# Patient Record
Sex: MALE | Race: WHITE | Employment: OTHER | URBAN - METROPOLITAN AREA
[De-identification: names, ages, dates, MRNs, and addresses within clinical notes are randomized per-mention and may not be internally consistent; named-entity substitution may affect disease eponyms.]

---

## 2017-01-12 ENCOUNTER — OFFICE VISIT (OUTPATIENT)
Dept: CARDIOLOGY CLINIC | Age: 82
End: 2017-01-12

## 2017-01-12 VITALS
OXYGEN SATURATION: 97 % | DIASTOLIC BLOOD PRESSURE: 60 MMHG | BODY MASS INDEX: 24.8 KG/M2 | WEIGHT: 140 LBS | HEART RATE: 50 BPM | SYSTOLIC BLOOD PRESSURE: 158 MMHG | HEIGHT: 63 IN

## 2017-01-12 DIAGNOSIS — I34.0 MITRAL VALVE INSUFFICIENCY, UNSPECIFIED ETIOLOGY: Primary | ICD-10-CM

## 2017-01-12 DIAGNOSIS — I50.22 CHRONIC SYSTOLIC CHF (CONGESTIVE HEART FAILURE) (HCC): ICD-10-CM

## 2017-01-12 DIAGNOSIS — I42.9 CARDIOMYOPATHY (HCC): ICD-10-CM

## 2017-01-12 DIAGNOSIS — I10 ESSENTIAL HYPERTENSION: ICD-10-CM

## 2017-01-12 PROCEDURE — 99214 OFFICE O/P EST MOD 30 MIN: CPT | Performed by: INTERNAL MEDICINE

## 2017-01-14 ENCOUNTER — HOSPITAL ENCOUNTER (OUTPATIENT)
Dept: OTHER | Age: 82
Discharge: HOME | End: 2017-01-14
Attending: INTERNAL MEDICINE | Admitting: INTERNAL MEDICINE

## 2017-01-14 LAB
A/G RATIO: 1.1 (ref 1.1–2.2)
ALBUMIN SERPL-MCNC: 3.9 G/DL (ref 3.4–5)
ALP BLD-CCNC: 77 U/L (ref 40–129)
ALT SERPL-CCNC: 13 U/L (ref 10–40)
ANION GAP SERPL CALCULATED.3IONS-SCNC: 10 MMOL/L (ref 3–16)
AST SERPL-CCNC: 18 U/L (ref 15–37)
BILIRUB SERPL-MCNC: 0.9 MG/DL (ref 0–1)
BUN BLDV-MCNC: 20 MG/DL (ref 7–20)
CALCIUM SERPL-MCNC: 9 MG/DL (ref 8.3–10.6)
CHLORIDE BLD-SCNC: 101 MMOL/L (ref 99–110)
CHOLESTEROL, TOTAL: 200 MG/DL (ref 0–199)
CO2: 27 MMOL/L (ref 21–32)
CREAT SERPL-MCNC: 1.2 MG/DL (ref 0.8–1.3)
GFR AFRICAN AMERICAN: >60
GFR NON-AFRICAN AMERICAN: 58
GLOBULIN: 3.6 G/DL
GLUCOSE BLD-MCNC: 91 MG/DL (ref 70–99)
HCT VFR BLD CALC: 39.4 % (ref 40.5–52.5)
HDLC SERPL-MCNC: 33 MG/DL (ref 40–60)
HEMOGLOBIN: 13.1 G/DL (ref 13.5–17.5)
LDL CHOLESTEROL CALCULATED: 139 MG/DL
MCH RBC QN AUTO: 29.9 PG (ref 26–34)
MCHC RBC AUTO-ENTMCNC: 33.3 G/DL (ref 31–36)
MCV RBC AUTO: 89.8 FL (ref 80–100)
PDW BLD-RTO: 14.1 % (ref 12.4–15.4)
PLATELET # BLD: 199 K/UL (ref 135–450)
PMV BLD AUTO: 8.2 FL (ref 5–10.5)
POTASSIUM SERPL-SCNC: 4.3 MMOL/L (ref 3.5–5.1)
RBC # BLD: 4.38 M/UL (ref 4.2–5.9)
SODIUM BLD-SCNC: 138 MMOL/L (ref 136–145)
TOTAL PROTEIN: 7.5 G/DL (ref 6.4–8.2)
TRIGL SERPL-MCNC: 139 MG/DL (ref 0–150)
VLDLC SERPL CALC-MCNC: 28 MG/DL
WBC # BLD: 6.8 K/UL (ref 4–11)

## 2017-01-16 ENCOUNTER — OFFICE VISIT (OUTPATIENT)
Dept: INTERNAL MEDICINE CLINIC | Age: 82
End: 2017-01-16

## 2017-01-16 ENCOUNTER — TELEPHONE (OUTPATIENT)
Dept: CARDIOLOGY CLINIC | Age: 82
End: 2017-01-16

## 2017-01-16 VITALS
DIASTOLIC BLOOD PRESSURE: 80 MMHG | RESPIRATION RATE: 14 BRPM | SYSTOLIC BLOOD PRESSURE: 140 MMHG | HEART RATE: 55 BPM | WEIGHT: 140 LBS | HEIGHT: 62 IN | BODY MASS INDEX: 25.76 KG/M2

## 2017-01-16 DIAGNOSIS — I42.9 CARDIOMYOPATHY (HCC): ICD-10-CM

## 2017-01-16 DIAGNOSIS — I10 ESSENTIAL HYPERTENSION: ICD-10-CM

## 2017-01-16 DIAGNOSIS — I50.22 CHRONIC SYSTOLIC HEART FAILURE (HCC): ICD-10-CM

## 2017-01-16 DIAGNOSIS — I50.22 CHRONIC SYSTOLIC CHF (CONGESTIVE HEART FAILURE) (HCC): Primary | ICD-10-CM

## 2017-01-16 DIAGNOSIS — I34.0 MITRAL VALVE INSUFFICIENCY, UNSPECIFIED ETIOLOGY: ICD-10-CM

## 2017-01-16 DIAGNOSIS — I42.9 SECONDARY CARDIOMYOPATHY, UNSPECIFIED: ICD-10-CM

## 2017-01-16 DIAGNOSIS — I11.0 HYPERTENSIVE HEART DISEASE WITH HEART FAILURE (HCC): ICD-10-CM

## 2017-01-16 PROCEDURE — 99213 OFFICE O/P EST LOW 20 MIN: CPT | Performed by: INTERNAL MEDICINE

## 2017-01-16 ASSESSMENT — ENCOUNTER SYMPTOMS
VOMITING: 0
BACK PAIN: 0
SHORTNESS OF BREATH: 0
ABDOMINAL PAIN: 0
WHEEZING: 0
NAUSEA: 0
RHINORRHEA: 0

## 2017-01-17 RX ORDER — AMLODIPINE BESYLATE 5 MG/1
5 TABLET ORAL DAILY
Qty: 90 TABLET | Refills: 3 | Status: SHIPPED | OUTPATIENT
Start: 2017-01-17 | End: 2018-01-04 | Stop reason: SDUPTHER

## 2017-01-17 RX ORDER — DIGOXIN 125 MCG
125 TABLET ORAL DAILY
Qty: 90 TABLET | Refills: 3 | Status: SHIPPED | OUTPATIENT
Start: 2017-01-17 | End: 2018-01-04 | Stop reason: SDUPTHER

## 2017-01-17 RX ORDER — LISINOPRIL 20 MG/1
20 TABLET ORAL DAILY
Qty: 90 TABLET | Refills: 3 | Status: SHIPPED | OUTPATIENT
Start: 2017-01-17 | End: 2018-01-04 | Stop reason: SDUPTHER

## 2017-01-17 RX ORDER — CARVEDILOL 12.5 MG/1
12.5 TABLET ORAL 2 TIMES DAILY
Qty: 180 TABLET | Refills: 3 | Status: SHIPPED | OUTPATIENT
Start: 2017-01-17 | End: 2018-01-04 | Stop reason: SDUPTHER

## 2017-04-25 ENCOUNTER — OFFICE VISIT (OUTPATIENT)
Dept: INTERNAL MEDICINE CLINIC | Age: 82
End: 2017-04-25

## 2017-04-25 VITALS
DIASTOLIC BLOOD PRESSURE: 80 MMHG | HEIGHT: 62 IN | HEART RATE: 70 BPM | RESPIRATION RATE: 14 BRPM | WEIGHT: 140 LBS | SYSTOLIC BLOOD PRESSURE: 150 MMHG | BODY MASS INDEX: 25.76 KG/M2

## 2017-04-25 DIAGNOSIS — I50.22 CHRONIC SYSTOLIC CHF (CONGESTIVE HEART FAILURE) (HCC): ICD-10-CM

## 2017-04-25 DIAGNOSIS — I11.0 HYPERTENSIVE HEART DISEASE WITH HEART FAILURE (HCC): ICD-10-CM

## 2017-04-25 DIAGNOSIS — I10 ESSENTIAL HYPERTENSION: Primary | ICD-10-CM

## 2017-04-25 DIAGNOSIS — I34.0 MITRAL VALVE INSUFFICIENCY, UNSPECIFIED ETIOLOGY: ICD-10-CM

## 2017-04-25 DIAGNOSIS — I42.9 CARDIOMYOPATHY (HCC): ICD-10-CM

## 2017-04-25 PROCEDURE — 99213 OFFICE O/P EST LOW 20 MIN: CPT | Performed by: INTERNAL MEDICINE

## 2017-04-25 ASSESSMENT — ENCOUNTER SYMPTOMS
RHINORRHEA: 0
VOMITING: 0
ABDOMINAL PAIN: 0
WHEEZING: 0
SHORTNESS OF BREATH: 0
NAUSEA: 0
BACK PAIN: 0

## 2017-07-31 ENCOUNTER — OFFICE VISIT (OUTPATIENT)
Dept: INTERNAL MEDICINE CLINIC | Age: 82
End: 2017-07-31

## 2017-07-31 VITALS
HEART RATE: 70 BPM | DIASTOLIC BLOOD PRESSURE: 80 MMHG | HEIGHT: 62 IN | BODY MASS INDEX: 24.66 KG/M2 | WEIGHT: 134 LBS | SYSTOLIC BLOOD PRESSURE: 140 MMHG | RESPIRATION RATE: 14 BRPM

## 2017-07-31 DIAGNOSIS — I50.22 CHRONIC SYSTOLIC CHF (CONGESTIVE HEART FAILURE) (HCC): ICD-10-CM

## 2017-07-31 DIAGNOSIS — I42.9 CARDIOMYOPATHY, UNSPECIFIED TYPE (HCC): ICD-10-CM

## 2017-07-31 DIAGNOSIS — I10 ESSENTIAL HYPERTENSION: ICD-10-CM

## 2017-07-31 DIAGNOSIS — I11.0 HYPERTENSIVE HEART DISEASE WITH HEART FAILURE (HCC): Primary | ICD-10-CM

## 2017-07-31 DIAGNOSIS — I34.0 MITRAL VALVE INSUFFICIENCY, UNSPECIFIED ETIOLOGY: ICD-10-CM

## 2017-07-31 LAB
BASOPHILS ABSOLUTE: 0 K/UL (ref 0–0.2)
BASOPHILS RELATIVE PERCENT: 0.6 %
EOSINOPHILS ABSOLUTE: 0.1 K/UL (ref 0–0.6)
EOSINOPHILS RELATIVE PERCENT: 1.8 %
HCT VFR BLD CALC: 41.7 % (ref 40.5–52.5)
HEMOGLOBIN: 14 G/DL (ref 13.5–17.5)
LYMPHOCYTES ABSOLUTE: 1.6 K/UL (ref 1–5.1)
LYMPHOCYTES RELATIVE PERCENT: 24.6 %
MCH RBC QN AUTO: 31 PG (ref 26–34)
MCHC RBC AUTO-ENTMCNC: 33.6 G/DL (ref 31–36)
MCV RBC AUTO: 92.3 FL (ref 80–100)
MONOCYTES ABSOLUTE: 0.6 K/UL (ref 0–1.3)
MONOCYTES RELATIVE PERCENT: 9.1 %
NEUTROPHILS ABSOLUTE: 4.2 K/UL (ref 1.7–7.7)
NEUTROPHILS RELATIVE PERCENT: 63.9 %
PDW BLD-RTO: 14.1 % (ref 12.4–15.4)
PLATELET # BLD: 229 K/UL (ref 135–450)
PMV BLD AUTO: 8.1 FL (ref 5–10.5)
RBC # BLD: 4.52 M/UL (ref 4.2–5.9)
WBC # BLD: 6.6 K/UL (ref 4–11)

## 2017-07-31 PROCEDURE — 99213 OFFICE O/P EST LOW 20 MIN: CPT | Performed by: INTERNAL MEDICINE

## 2017-07-31 ASSESSMENT — ENCOUNTER SYMPTOMS
ABDOMINAL PAIN: 0
NAUSEA: 0
RHINORRHEA: 0
VOMITING: 0
BACK PAIN: 0
SHORTNESS OF BREATH: 0
WHEEZING: 0

## 2017-08-01 LAB
A/G RATIO: 1.3 (ref 1.1–2.2)
ALBUMIN SERPL-MCNC: 4.3 G/DL (ref 3.4–5)
ALP BLD-CCNC: 72 U/L (ref 40–129)
ALT SERPL-CCNC: 14 U/L (ref 10–40)
ANION GAP SERPL CALCULATED.3IONS-SCNC: 14 MMOL/L (ref 3–16)
AST SERPL-CCNC: 17 U/L (ref 15–37)
BILIRUB SERPL-MCNC: 0.8 MG/DL (ref 0–1)
BUN BLDV-MCNC: 25 MG/DL (ref 7–20)
CALCIUM SERPL-MCNC: 9.5 MG/DL (ref 8.3–10.6)
CHLORIDE BLD-SCNC: 98 MMOL/L (ref 99–110)
CO2: 27 MMOL/L (ref 21–32)
CREAT SERPL-MCNC: 1.1 MG/DL (ref 0.8–1.3)
GFR AFRICAN AMERICAN: >60
GFR NON-AFRICAN AMERICAN: >60
GLOBULIN: 3.2 G/DL
GLUCOSE BLD-MCNC: 102 MG/DL (ref 70–99)
POTASSIUM SERPL-SCNC: 4.7 MMOL/L (ref 3.5–5.1)
SODIUM BLD-SCNC: 139 MMOL/L (ref 136–145)
TOTAL PROTEIN: 7.5 G/DL (ref 6.4–8.2)

## 2017-11-06 ENCOUNTER — OFFICE VISIT (OUTPATIENT)
Dept: INTERNAL MEDICINE CLINIC | Age: 82
End: 2017-11-06

## 2017-11-06 VITALS
HEIGHT: 63 IN | WEIGHT: 136 LBS | DIASTOLIC BLOOD PRESSURE: 80 MMHG | SYSTOLIC BLOOD PRESSURE: 150 MMHG | HEART RATE: 70 BPM | RESPIRATION RATE: 14 BRPM | BODY MASS INDEX: 24.1 KG/M2

## 2017-11-06 DIAGNOSIS — I11.0 HYPERTENSIVE HEART DISEASE WITH HEART FAILURE (HCC): ICD-10-CM

## 2017-11-06 DIAGNOSIS — I50.22 CHRONIC SYSTOLIC CHF (CONGESTIVE HEART FAILURE) (HCC): ICD-10-CM

## 2017-11-06 DIAGNOSIS — I10 ESSENTIAL HYPERTENSION: Primary | ICD-10-CM

## 2017-11-06 DIAGNOSIS — I42.9 CARDIOMYOPATHY, UNSPECIFIED TYPE (HCC): ICD-10-CM

## 2017-11-06 DIAGNOSIS — I34.0 MITRAL VALVE INSUFFICIENCY, UNSPECIFIED ETIOLOGY: ICD-10-CM

## 2017-11-06 PROCEDURE — 99214 OFFICE O/P EST MOD 30 MIN: CPT | Performed by: INTERNAL MEDICINE

## 2017-11-06 ASSESSMENT — ENCOUNTER SYMPTOMS
NAUSEA: 0
RHINORRHEA: 0
WHEEZING: 0
ABDOMINAL PAIN: 0
SHORTNESS OF BREATH: 0
BACK PAIN: 0
VOMITING: 0

## 2017-11-06 NOTE — PROGRESS NOTES
velocities across the mitral valve  suggesting type I diastolic dysfunction. Mild aortic regurgitation is present. Assessment:      1. Essential hypertension     2. Hypertensive heart disease with heart failure ([address]     3. Chronic systolic CHF (congestive heart failure) (HCC)     4. Cardiomyopathy, unspecified type ([facility] [address]     5. Mitral valve insufficiency, unspecified etiology            Plan:      Chronic systolic CHF. He is off lasix and aldactone. He is on Coreg and Lisinopril. Doing well. 2.  Hypertension:  Fair control. 3.  Hypertensive heart disease. From uncontrolled HTN. Improved. 4.  CMP from hypertension. Stable. 5.  MR and AI: stable. He refused flu shot and labs.

## 2018-01-04 ENCOUNTER — OFFICE VISIT (OUTPATIENT)
Dept: CARDIOLOGY CLINIC | Age: 83
End: 2018-01-04

## 2018-01-04 VITALS
BODY MASS INDEX: 24.45 KG/M2 | HEART RATE: 54 BPM | OXYGEN SATURATION: 97 % | WEIGHT: 138 LBS | HEIGHT: 63 IN | DIASTOLIC BLOOD PRESSURE: 84 MMHG | SYSTOLIC BLOOD PRESSURE: 140 MMHG

## 2018-01-04 DIAGNOSIS — I42.9 CARDIOMYOPATHY, UNSPECIFIED TYPE (HCC): ICD-10-CM

## 2018-01-04 DIAGNOSIS — I50.22 CHRONIC SYSTOLIC CHF (CONGESTIVE HEART FAILURE) (HCC): Primary | ICD-10-CM

## 2018-01-04 DIAGNOSIS — I11.0 HYPERTENSIVE HEART DISEASE WITH HEART FAILURE (HCC): ICD-10-CM

## 2018-01-04 PROCEDURE — 99214 OFFICE O/P EST MOD 30 MIN: CPT | Performed by: INTERNAL MEDICINE

## 2018-01-04 RX ORDER — DIGOXIN 125 MCG
125 TABLET ORAL DAILY
Qty: 90 TABLET | Refills: 3 | Status: SHIPPED | OUTPATIENT
Start: 2018-01-04 | End: 2019-01-22 | Stop reason: SDUPTHER

## 2018-01-04 RX ORDER — LISINOPRIL 20 MG/1
20 TABLET ORAL DAILY
Qty: 90 TABLET | Refills: 3 | Status: SHIPPED | OUTPATIENT
Start: 2018-01-04 | End: 2019-01-22 | Stop reason: SDUPTHER

## 2018-01-04 RX ORDER — CARVEDILOL 12.5 MG/1
12.5 TABLET ORAL 2 TIMES DAILY
Qty: 180 TABLET | Refills: 3 | Status: SHIPPED | OUTPATIENT
Start: 2018-01-04 | End: 2019-01-22 | Stop reason: SDUPTHER

## 2018-01-04 RX ORDER — AMLODIPINE BESYLATE 5 MG/1
5 TABLET ORAL DAILY
Qty: 90 TABLET | Refills: 3 | Status: SHIPPED | OUTPATIENT
Start: 2018-01-04 | End: 2019-01-22 | Stop reason: SDUPTHER

## 2018-01-04 NOTE — PROGRESS NOTES
[address] Office Note  2018     Subjective:  Mr. Jade Faust is here for cardiac follow up sCHF, HTN,CMP, MR    Today he presents for follow up. States he has been feeling in good shape. He denies any active chest pain, SOB, palpitations, dizziness/syncope or edema. He is doing good from cardiac standpoint. He reports his home BP readings has been good average 862-963 systolic over 34-46. He monitors his BP at home. HPI:  Schuyler Cabot is a 80 y.o. patient with PMH HTN which has been untreated for 20 years. He reports no medical care for many years. He was admitted to [facility] 1/7/15    His son took his Blood pressure which was noted to be high systolic 164. Patients SOB progressively worsened over week until he had to sleep in recliner for rest.  In the ER his BP was 194/120, EKG revealed Sinus tachycardiaPossible Left atrial enlargementST & T wave abnormality, consider lateral ischemia, Troponin was negative, BNP was 11705. Echo this admission shows EF 26%, Severe global hypokinesis, Moderate mitral regurgitation, moderate tricuspid regurgitation, moderate pulmonary hypertension. Repeat Limited Echo on 2015 with EF of 62% and improved MR from moderate to mild. Review of Systems:  12 point ROS negative in all areas as listed below except as in [place]  Constitutional, EENT, Cardiovascular, pulmonary, GI, , Musculoskeletal, skin, neurological, hematological, endocrine, Psychiatric    Reviewed past medical history, social, and family history.             Both parents  suddenly  Nonsmoker no alcohol former smoker 2 ppd x 30 yrs quit 36 yrs ago  Dad no heart dz Mom no heart dz  Past Medical History:   Diagnosis Date    Chronic systolic CHF [facility]congestive heart failure) ([facility] [address]     Heart disease     Hypertension      Past Surgical History:   Procedure Laterality Date    CATARACT REMOVAL WITH IMPLANT Left 3/16/16    EYE SURGERY Right 16    Cataract removal with implant Dr. Simone Garcia for: CHLPL  Lab Results   Component Value Date    TRIG 139 01/14/2017    TRIG 66 01/08/2015     Lab Results   Component Value Date    HDL 33 (L) 01/14/2017    HDL 34 (L) 01/08/2015     Lab Results   Component Value Date    LDLCALC 139 (H) 01/14/2017    LDLCALC 98 01/08/2015     Lab Results   Component Value Date    LABVLDL 28 01/14/2017    LABVLDL 13 01/08/2015     PT/INR: No results for input(s): PROTIME, INR in the last 72 hours. A1C:   Lab Results   Component Value Date    LABA1C 5.2 01/08/2015     BNP:  No results for input(s): BNP in the last 72 hours. IMAGING:   EKG 1/14/16 SB with non specific ST and T changes in lateral leads   ECHO 7/21/15  Summary  This is a limited study for CHF, cardiomyopathy and mitral regurgitation. Ejection fraction calculated by Monge's method is 62 %. LV systolic function is normal with ejection fraction estimated to be 60%. No regional wall motion abnormalities are noted. Mild mitral regurgitation is present. There is mild concentric left ventricular hypertrophy. There is reversal of E/A inflow velocities across the mitral valve  suggesting type I diastolic dysfunction. Mild aortic regurgitation is present. ECHO 1/7/15  Summary  sinus rhythm is present during the test.  The ejection fraction measured by Monge's method is 26 %. Severe global hypokinesis is present. Left ventricular size is at upper limit of normal.  There is mild concentric left ventricular hypertrophy. Doppler study suggests diastolic dysfunction with impaired relaxation and  elevated filling pressure. Interventricular septum is flat consistent   with  volume and pressure overload of right ventricle. Mitral valve is structurally normal.  Mitral valve leaflets appear to open adequately. Moderate mitral regurgitation is present. ERO 0.41 with MR volume of 51ml. The left atrium is severely dilated by volume measurement.   The aortic valve is structurally normal.  The aortic leaflets appear to open

## 2018-01-04 NOTE — COMMUNICATION BODY
[address] Office Note  2018     Subjective:  Mr. Khang Adame is here for cardiac follow up sCHF, HTN,CMP, MR    Today he presents for follow up. States he has been feeling in good shape. He denies any active chest pain, SOB, palpitations, dizziness/syncope or edema. He is doing good from cardiac standpoint. He reports his home BP readings has been good average 149-612 systolic over 45-21. He monitors his BP at home. HPI:  Kd Smith is a 80 y.o. patient with PMH HTN which has been untreated for 20 years. He reports no medical care for many years. He was admitted to [facility] 1/7/15    His son took his Blood pressure which was noted to be high systolic 338. Patients SOB progressively worsened over week until he had to sleep in recliner for rest.  In the ER his BP was 194/120, EKG revealed Sinus tachycardiaPossible Left atrial enlargementST & T wave abnormality, consider lateral ischemia, Troponin was negative, BNP was 58161. Echo this admission shows EF 26%, Severe global hypokinesis, Moderate mitral regurgitation, moderate tricuspid regurgitation, moderate pulmonary hypertension. Repeat Limited Echo on 2015 with EF of 62% and improved MR from moderate to mild. Review of Systems:  12 point ROS negative in all areas as listed below except as in [place]  Constitutional, EENT, Cardiovascular, pulmonary, GI, , Musculoskeletal, skin, neurological, hematological, endocrine, Psychiatric    Reviewed past medical history, social, and family history.             Both parents  suddenly  Nonsmoker no alcohol former smoker 2 ppd x 30 yrs quit 36 yrs ago  Dad no heart dz Mom no heart dz  Past Medical History:   Diagnosis Date    Chronic systolic CHF (congestive heart failure) ([facility] [address]     Heart disease     Hypertension      Past Surgical History:   Procedure Laterality Date    CATARACT REMOVAL WITH IMPLANT Left 3/16/16    EYE SURGERY Right 16    Cataract removal with implant Dr. Lonnie Martinez for: CHLPL  Lab Results   Component Value Date    TRIG 139 01/14/2017    TRIG 66 01/08/2015     Lab Results   Component Value Date    HDL 33 (L) 01/14/2017    HDL 34 (L) 01/08/2015     Lab Results   Component Value Date    LDLCALC 139 (H) 01/14/2017    LDLCALC 98 01/08/2015     Lab Results   Component Value Date    LABVLDL 28 01/14/2017    LABVLDL 13 01/08/2015     PT/INR: No results for input(s): PROTIME, INR in the last 72 hours. A1C:   Lab Results   Component Value Date    LABA1C 5.2 01/08/2015     BNP:  No results for input(s): BNP in the last 72 hours. IMAGING:   EKG 1/14/16 SB with non specific ST and T changes in lateral leads   ECHO 7/21/15  Summary  This is a limited study for CHF, cardiomyopathy and mitral regurgitation. Ejection fraction calculated by Monge's method is 62 %. LV systolic function is normal with ejection fraction estimated to be 60%. No regional wall motion abnormalities are noted. Mild mitral regurgitation is present. There is mild concentric left ventricular hypertrophy. There is reversal of E/A inflow velocities across the mitral valve  suggesting type I diastolic dysfunction. Mild aortic regurgitation is present. ECHO 1/7/15  Summary  sinus rhythm is present during the test.  The ejection fraction measured by Monge's method is 26 %. Severe global hypokinesis is present. Left ventricular size is at upper limit of normal.  There is mild concentric left ventricular hypertrophy. Doppler study suggests diastolic dysfunction with impaired relaxation and  elevated filling pressure. Interventricular septum is flat consistent   with  volume and pressure overload of right ventricle. Mitral valve is structurally normal.  Mitral valve leaflets appear to open adequately. Moderate mitral regurgitation is present. ERO 0.41 with MR volume of 51ml. The left atrium is severely dilated by volume measurement.   The aortic valve is structurally normal.  The aortic leaflets appear to open adequately. The aortic valve appears tricuspid. Mild aortic regurgitation is present. The aortic root is slightly enlarged at 3.8cm. Right ventricular systolic function is normal .  The right ventricle is enlarged. Tricuspid valve is structurally normal.  The tricuspid valve leaflets appear to open adequately. There is moderate tricuspid regurgitation. Systolic pulmonary artery pressure (SPAP) is estimated at 51 mmHg   consistent  with moderate pulmonary hypertension (RA pressure 15 mmHg). The right atrium is severely dilated. IVC size is dilated (>2.1 cm) and collapses < 50% with respiration  consistent with markedly elevated RA pressure (15 mmHg). No obvious masses, thrombi, or vegetations are noted. Assessment:    Cardiomyopathy (HCC)EF normalized, no clinical signs of CHF. He is very active outside home. BP borderline high but he states at home it usually is below 140/90  Hypertensive heart disease with heart failure (HCC)    Mitral valve insufficiency, unspecified etiology    Chronic systolic CHF (congestive heart failure) ([facility] [address] resolved    Essential hypertension, home BP normal             Plan:  1. Doing well overall. 2. Follow up with me in 1 year   3. Will check   Fasting cmp and lipids patient wants to wait until he sees Dr Giovanni Taylor  4.all meds have been filled according to the list for one year. Existing Labs in epic reviewed   QUALITY MEASURES  1. Tobacco Cessation Counseling: NA  2. Retake of BP if >140/90:  Yes  3. Documentation to PCP/referring for new patient:  Sent to PCP at close of office visit  4. CAD patient on anti-platelet: Yes  5. CAD patient on STATIN therapy:  NA  6.  Patient with CHF and aFib on anticoagulation:  NA     [address] [place], MD 1/4/2018 12:45 PM

## 2018-02-27 ENCOUNTER — TELEPHONE (OUTPATIENT)
Dept: INTERNAL MEDICINE CLINIC | Age: 83
End: 2018-02-27

## 2018-06-26 ENCOUNTER — OFFICE VISIT (OUTPATIENT)
Dept: INTERNAL MEDICINE CLINIC | Age: 83
End: 2018-06-26

## 2018-06-26 VITALS
DIASTOLIC BLOOD PRESSURE: 80 MMHG | SYSTOLIC BLOOD PRESSURE: 150 MMHG | HEART RATE: 70 BPM | BODY MASS INDEX: 24.66 KG/M2 | HEIGHT: 62 IN | WEIGHT: 134 LBS | RESPIRATION RATE: 14 BRPM

## 2018-06-26 DIAGNOSIS — I50.22 CHRONIC SYSTOLIC CHF (CONGESTIVE HEART FAILURE) (HCC): ICD-10-CM

## 2018-06-26 DIAGNOSIS — I11.0 HYPERTENSIVE HEART DISEASE WITH HEART FAILURE (HCC): ICD-10-CM

## 2018-06-26 DIAGNOSIS — I10 ESSENTIAL HYPERTENSION: ICD-10-CM

## 2018-06-26 DIAGNOSIS — I34.0 MITRAL VALVE INSUFFICIENCY, UNSPECIFIED ETIOLOGY: ICD-10-CM

## 2018-06-26 DIAGNOSIS — I42.9 CARDIOMYOPATHY, UNSPECIFIED TYPE (HCC): ICD-10-CM

## 2018-06-26 DIAGNOSIS — I10 ESSENTIAL HYPERTENSION: Primary | ICD-10-CM

## 2018-06-26 LAB
A/G RATIO: 1.4 (ref 1.1–2.2)
ALBUMIN SERPL-MCNC: 4.4 G/DL (ref 3.4–5)
ALP BLD-CCNC: 85 U/L (ref 40–129)
ALT SERPL-CCNC: 16 U/L (ref 10–40)
ANION GAP SERPL CALCULATED.3IONS-SCNC: 13 MMOL/L (ref 3–16)
AST SERPL-CCNC: 21 U/L (ref 15–37)
BASOPHILS ABSOLUTE: 0.1 K/UL (ref 0–0.2)
BASOPHILS RELATIVE PERCENT: 1 %
BILIRUB SERPL-MCNC: 0.8 MG/DL (ref 0–1)
BUN BLDV-MCNC: 17 MG/DL (ref 7–20)
CALCIUM SERPL-MCNC: 9.5 MG/DL (ref 8.3–10.6)
CHLORIDE BLD-SCNC: 101 MMOL/L (ref 99–110)
CHOLESTEROL, TOTAL: 240 MG/DL (ref 0–199)
CO2: 26 MMOL/L (ref 21–32)
CREAT SERPL-MCNC: 1 MG/DL (ref 0.8–1.3)
EOSINOPHILS ABSOLUTE: 0.1 K/UL (ref 0–0.6)
EOSINOPHILS RELATIVE PERCENT: 2.5 %
GFR AFRICAN AMERICAN: >60
GFR NON-AFRICAN AMERICAN: >60
GLOBULIN: 3.2 G/DL
GLUCOSE BLD-MCNC: 95 MG/DL (ref 70–99)
HCT VFR BLD CALC: 43 % (ref 40.5–52.5)
HDLC SERPL-MCNC: 41 MG/DL (ref 40–60)
HEMOGLOBIN: 14.9 G/DL (ref 13.5–17.5)
LDL CHOLESTEROL CALCULATED: 162 MG/DL
LYMPHOCYTES ABSOLUTE: 1.2 K/UL (ref 1–5.1)
LYMPHOCYTES RELATIVE PERCENT: 22 %
MCH RBC QN AUTO: 31.4 PG (ref 26–34)
MCHC RBC AUTO-ENTMCNC: 34.7 G/DL (ref 31–36)
MCV RBC AUTO: 90.4 FL (ref 80–100)
MONOCYTES ABSOLUTE: 0.5 K/UL (ref 0–1.3)
MONOCYTES RELATIVE PERCENT: 8.9 %
NEUTROPHILS ABSOLUTE: 3.7 K/UL (ref 1.7–7.7)
NEUTROPHILS RELATIVE PERCENT: 65.6 %
PDW BLD-RTO: 14.4 % (ref 12.4–15.4)
PLATELET # BLD: 201 K/UL (ref 135–450)
PMV BLD AUTO: 8.4 FL (ref 5–10.5)
POTASSIUM SERPL-SCNC: 4 MMOL/L (ref 3.5–5.1)
RBC # BLD: 4.75 M/UL (ref 4.2–5.9)
SODIUM BLD-SCNC: 140 MMOL/L (ref 136–145)
TOTAL PROTEIN: 7.6 G/DL (ref 6.4–8.2)
TRIGL SERPL-MCNC: 187 MG/DL (ref 0–150)
TSH SERPL DL<=0.05 MIU/L-ACNC: 2.94 UIU/ML (ref 0.27–4.2)
URIC ACID, SERUM: 6.2 MG/DL (ref 3.5–7.2)
VLDLC SERPL CALC-MCNC: 37 MG/DL
WBC # BLD: 5.6 K/UL (ref 4–11)

## 2018-06-26 PROCEDURE — 99214 OFFICE O/P EST MOD 30 MIN: CPT | Performed by: INTERNAL MEDICINE

## 2018-06-26 ASSESSMENT — ENCOUNTER SYMPTOMS
VOMITING: 0
RHINORRHEA: 0
ABDOMINAL PAIN: 0
WHEEZING: 0
SHORTNESS OF BREATH: 0
NAUSEA: 0
BACK PAIN: 0

## 2018-10-01 ENCOUNTER — OFFICE VISIT (OUTPATIENT)
Dept: INTERNAL MEDICINE CLINIC | Age: 83
End: 2018-10-01

## 2018-10-01 VITALS
RESPIRATION RATE: 14 BRPM | SYSTOLIC BLOOD PRESSURE: 190 MMHG | HEIGHT: 62 IN | HEART RATE: 80 BPM | DIASTOLIC BLOOD PRESSURE: 80 MMHG | BODY MASS INDEX: 24.48 KG/M2 | WEIGHT: 133 LBS

## 2018-10-01 DIAGNOSIS — I11.0 HYPERTENSIVE HEART DISEASE WITH CHRONIC SYSTOLIC CONGESTIVE HEART FAILURE (HCC): ICD-10-CM

## 2018-10-01 DIAGNOSIS — I10 ESSENTIAL HYPERTENSION: Primary | ICD-10-CM

## 2018-10-01 DIAGNOSIS — I50.22 CHRONIC SYSTOLIC CHF (CONGESTIVE HEART FAILURE) (HCC): ICD-10-CM

## 2018-10-01 DIAGNOSIS — I42.9 CARDIOMYOPATHY, UNSPECIFIED TYPE (HCC): ICD-10-CM

## 2018-10-01 DIAGNOSIS — I34.0 MITRAL VALVE INSUFFICIENCY, UNSPECIFIED ETIOLOGY: ICD-10-CM

## 2018-10-01 DIAGNOSIS — I50.22 HYPERTENSIVE HEART DISEASE WITH CHRONIC SYSTOLIC CONGESTIVE HEART FAILURE (HCC): ICD-10-CM

## 2018-10-01 PROCEDURE — 99214 OFFICE O/P EST MOD 30 MIN: CPT | Performed by: INTERNAL MEDICINE

## 2018-10-01 ASSESSMENT — ENCOUNTER SYMPTOMS
VOMITING: 0
RHINORRHEA: 0
NAUSEA: 0
SHORTNESS OF BREATH: 0
WHEEZING: 0
ABDOMINAL PAIN: 0
BACK PAIN: 0

## 2019-01-22 ENCOUNTER — OFFICE VISIT (OUTPATIENT)
Dept: INTERNAL MEDICINE CLINIC | Age: 84
End: 2019-01-22

## 2019-01-22 ENCOUNTER — OFFICE VISIT (OUTPATIENT)
Dept: CARDIOLOGY CLINIC | Age: 84
End: 2019-01-22
Payer: COMMERCIAL

## 2019-01-22 VITALS
HEART RATE: 60 BPM | OXYGEN SATURATION: 97 % | DIASTOLIC BLOOD PRESSURE: 78 MMHG | BODY MASS INDEX: 24.1 KG/M2 | SYSTOLIC BLOOD PRESSURE: 130 MMHG | HEIGHT: 63 IN | WEIGHT: 136 LBS

## 2019-01-22 VITALS
WEIGHT: 134 LBS | HEIGHT: 61 IN | DIASTOLIC BLOOD PRESSURE: 80 MMHG | SYSTOLIC BLOOD PRESSURE: 180 MMHG | HEART RATE: 60 BPM | RESPIRATION RATE: 14 BRPM | BODY MASS INDEX: 25.3 KG/M2

## 2019-01-22 DIAGNOSIS — I10 ESSENTIAL HYPERTENSION: Primary | ICD-10-CM

## 2019-01-22 DIAGNOSIS — I50.22 CHRONIC SYSTOLIC CHF (CONGESTIVE HEART FAILURE) (HCC): ICD-10-CM

## 2019-01-22 DIAGNOSIS — E78.2 MIXED HYPERLIPIDEMIA: ICD-10-CM

## 2019-01-22 DIAGNOSIS — I42.9 CARDIOMYOPATHY, UNSPECIFIED TYPE (HCC): ICD-10-CM

## 2019-01-22 DIAGNOSIS — I11.0 HYPERTENSIVE HEART DISEASE WITH CHRONIC SYSTOLIC CONGESTIVE HEART FAILURE (HCC): ICD-10-CM

## 2019-01-22 DIAGNOSIS — I50.22 CHRONIC SYSTOLIC CHF (CONGESTIVE HEART FAILURE) (HCC): Primary | ICD-10-CM

## 2019-01-22 DIAGNOSIS — I50.22 HYPERTENSIVE HEART DISEASE WITH CHRONIC SYSTOLIC CONGESTIVE HEART FAILURE (HCC): ICD-10-CM

## 2019-01-22 DIAGNOSIS — I10 ESSENTIAL HYPERTENSION: ICD-10-CM

## 2019-01-22 DIAGNOSIS — I34.0 MITRAL VALVE INSUFFICIENCY, UNSPECIFIED ETIOLOGY: ICD-10-CM

## 2019-01-22 LAB
A/G RATIO: 1.4 (ref 1.1–2.2)
ALBUMIN SERPL-MCNC: 4.5 G/DL (ref 3.4–5)
ALP BLD-CCNC: 93 U/L (ref 40–129)
ALT SERPL-CCNC: 13 U/L (ref 10–40)
ANION GAP SERPL CALCULATED.3IONS-SCNC: 14 MMOL/L (ref 3–16)
AST SERPL-CCNC: 17 U/L (ref 15–37)
BASOPHILS ABSOLUTE: 0 K/UL (ref 0–0.2)
BASOPHILS RELATIVE PERCENT: 0.6 %
BILIRUB SERPL-MCNC: 0.6 MG/DL (ref 0–1)
BUN BLDV-MCNC: 20 MG/DL (ref 7–20)
CALCIUM SERPL-MCNC: 9.2 MG/DL (ref 8.3–10.6)
CHLORIDE BLD-SCNC: 102 MMOL/L (ref 99–110)
CO2: 26 MMOL/L (ref 21–32)
CREAT SERPL-MCNC: 1 MG/DL (ref 0.8–1.3)
EOSINOPHILS ABSOLUTE: 0.1 K/UL (ref 0–0.6)
EOSINOPHILS RELATIVE PERCENT: 1.6 %
GFR AFRICAN AMERICAN: >60
GFR NON-AFRICAN AMERICAN: >60
GLOBULIN: 3.3 G/DL
GLUCOSE BLD-MCNC: 98 MG/DL (ref 70–99)
HCT VFR BLD CALC: 44 % (ref 40.5–52.5)
HEMOGLOBIN: 15 G/DL (ref 13.5–17.5)
LYMPHOCYTES ABSOLUTE: 1.3 K/UL (ref 1–5.1)
LYMPHOCYTES RELATIVE PERCENT: 19 %
MCH RBC QN AUTO: 31.1 PG (ref 26–34)
MCHC RBC AUTO-ENTMCNC: 34 G/DL (ref 31–36)
MCV RBC AUTO: 91.6 FL (ref 80–100)
MONOCYTES ABSOLUTE: 0.6 K/UL (ref 0–1.3)
MONOCYTES RELATIVE PERCENT: 9 %
NEUTROPHILS ABSOLUTE: 4.9 K/UL (ref 1.7–7.7)
NEUTROPHILS RELATIVE PERCENT: 69.8 %
PDW BLD-RTO: 14.1 % (ref 12.4–15.4)
PLATELET # BLD: 196 K/UL (ref 135–450)
PMV BLD AUTO: 9 FL (ref 5–10.5)
POTASSIUM SERPL-SCNC: 4.1 MMOL/L (ref 3.5–5.1)
RBC # BLD: 4.81 M/UL (ref 4.2–5.9)
SODIUM BLD-SCNC: 142 MMOL/L (ref 136–145)
TOTAL PROTEIN: 7.8 G/DL (ref 6.4–8.2)
WBC # BLD: 7 K/UL (ref 4–11)

## 2019-01-22 PROCEDURE — 99214 OFFICE O/P EST MOD 30 MIN: CPT | Performed by: INTERNAL MEDICINE

## 2019-01-22 RX ORDER — ATORVASTATIN CALCIUM 10 MG/1
10 TABLET, FILM COATED ORAL DAILY
Qty: 90 TABLET | Refills: 3 | Status: SHIPPED | OUTPATIENT
Start: 2019-01-22 | End: 2019-08-05 | Stop reason: SDUPTHER

## 2019-01-22 RX ORDER — DIGOXIN 125 MCG
125 TABLET ORAL DAILY
Qty: 90 TABLET | Refills: 3 | Status: SHIPPED | OUTPATIENT
Start: 2019-01-22 | End: 2019-08-05 | Stop reason: SDUPTHER

## 2019-01-22 RX ORDER — CARVEDILOL 12.5 MG/1
12.5 TABLET ORAL 2 TIMES DAILY
Qty: 180 TABLET | Refills: 3 | Status: SHIPPED | OUTPATIENT
Start: 2019-01-22 | End: 2019-08-05 | Stop reason: SDUPTHER

## 2019-01-22 RX ORDER — AMLODIPINE BESYLATE 5 MG/1
5 TABLET ORAL DAILY
Qty: 90 TABLET | Refills: 3 | Status: SHIPPED | OUTPATIENT
Start: 2019-01-22 | End: 2019-08-05 | Stop reason: SDUPTHER

## 2019-01-22 RX ORDER — LISINOPRIL 20 MG/1
20 TABLET ORAL DAILY
Qty: 90 TABLET | Refills: 3 | Status: SHIPPED | OUTPATIENT
Start: 2019-01-22 | End: 2019-08-05 | Stop reason: SDUPTHER

## 2019-01-22 ASSESSMENT — ENCOUNTER SYMPTOMS
RHINORRHEA: 0
VOMITING: 0
WHEEZING: 0
BACK PAIN: 0
SHORTNESS OF BREATH: 0
ABDOMINAL PAIN: 0
NAUSEA: 0

## 2019-01-22 ASSESSMENT — PATIENT HEALTH QUESTIONNAIRE - PHQ9
SUM OF ALL RESPONSES TO PHQ QUESTIONS 1-9: 0
2. FEELING DOWN, DEPRESSED OR HOPELESS: 0
SUM OF ALL RESPONSES TO PHQ9 QUESTIONS 1 & 2: 0
1. LITTLE INTEREST OR PLEASURE IN DOING THINGS: 0
SUM OF ALL RESPONSES TO PHQ QUESTIONS 1-9: 0

## 2019-02-04 ENCOUNTER — TELEPHONE (OUTPATIENT)
Dept: INTERNAL MEDICINE CLINIC | Age: 84
End: 2019-02-04

## 2019-02-04 ENCOUNTER — HOSPITAL ENCOUNTER (OUTPATIENT)
Age: 84
Discharge: HOME | End: 2019-02-04
Payer: MEDICARE

## 2019-02-04 ENCOUNTER — HOSPITAL ENCOUNTER (OUTPATIENT)
Dept: GENERAL RADIOLOGY | Age: 84
Discharge: HOME | End: 2019-02-04
Payer: MEDICARE

## 2019-02-04 DIAGNOSIS — M79.604 RIGHT LEG PAIN: ICD-10-CM

## 2019-02-04 DIAGNOSIS — M79.604 RIGHT LEG PAIN: Primary | ICD-10-CM

## 2019-02-04 PROCEDURE — 73590 X-RAY EXAM OF LOWER LEG: CPT

## 2019-02-04 PROCEDURE — 73552 X-RAY EXAM OF FEMUR 2/>: CPT

## 2019-03-15 ENCOUNTER — HOSPITAL ENCOUNTER (EMERGENCY)
Age: 84
Discharge: HOME | End: 2019-03-15
Attending: EMERGENCY MEDICINE
Payer: MEDICARE

## 2019-03-15 ENCOUNTER — APPOINTMENT (OUTPATIENT)
Dept: GENERAL RADIOLOGY | Age: 84
End: 2019-03-15
Payer: MEDICARE

## 2019-03-15 VITALS
DIASTOLIC BLOOD PRESSURE: 66 MMHG | SYSTOLIC BLOOD PRESSURE: 133 MMHG | RESPIRATION RATE: 16 BRPM | HEIGHT: 63 IN | BODY MASS INDEX: 21.97 KG/M2 | HEART RATE: 66 BPM | TEMPERATURE: 101.2 F | WEIGHT: 124 LBS | OXYGEN SATURATION: 99 %

## 2019-03-15 DIAGNOSIS — B34.9 ACUTE VIRAL SYNDROME: Primary | ICD-10-CM

## 2019-03-15 LAB
A/G RATIO: 1.1 (ref 1.1–2.2)
ALBUMIN SERPL-MCNC: 3.9 G/DL (ref 3.4–5)
ALP BLD-CCNC: 86 U/L (ref 40–129)
ALT SERPL-CCNC: 18 U/L (ref 10–40)
ANION GAP SERPL CALCULATED.3IONS-SCNC: 9 MMOL/L (ref 3–16)
AST SERPL-CCNC: 21 U/L (ref 15–37)
BASOPHILS ABSOLUTE: 0.1 K/UL (ref 0–0.2)
BASOPHILS RELATIVE PERCENT: 0.6 %
BILIRUB SERPL-MCNC: 1.1 MG/DL (ref 0–1)
BILIRUBIN URINE: NEGATIVE
BLOOD, URINE: NEGATIVE
BUN BLDV-MCNC: 25 MG/DL (ref 7–20)
CALCIUM SERPL-MCNC: 9.1 MG/DL (ref 8.3–10.6)
CHLORIDE BLD-SCNC: 99 MMOL/L (ref 99–110)
CLARITY: CLEAR
CO2: 25 MMOL/L (ref 21–32)
COLOR: YELLOW
CREAT SERPL-MCNC: 1.2 MG/DL (ref 0.8–1.3)
EKG ATRIAL RATE: 72 BPM
EKG DIAGNOSIS: NORMAL
EKG P AXIS: 77 DEGREES
EKG P-R INTERVAL: 190 MS
EKG Q-T INTERVAL: 376 MS
EKG QRS DURATION: 98 MS
EKG QTC CALCULATION (BAZETT): 411 MS
EKG R AXIS: 21 DEGREES
EKG T AXIS: 82 DEGREES
EKG VENTRICULAR RATE: 72 BPM
EOSINOPHILS ABSOLUTE: 0.1 K/UL (ref 0–0.6)
EOSINOPHILS RELATIVE PERCENT: 0.8 %
GFR AFRICAN AMERICAN: >60
GFR NON-AFRICAN AMERICAN: 58
GLOBULIN: 3.6 G/DL
GLUCOSE BLD-MCNC: 158 MG/DL (ref 70–99)
GLUCOSE URINE: NEGATIVE MG/DL
HCT VFR BLD CALC: 39.5 % (ref 40.5–52.5)
HEMOGLOBIN: 13.6 G/DL (ref 13.5–17.5)
KETONES, URINE: NEGATIVE MG/DL
LEUKOCYTE ESTERASE, URINE: NEGATIVE
LYMPHOCYTES ABSOLUTE: 0.7 K/UL (ref 1–5.1)
LYMPHOCYTES RELATIVE PERCENT: 5.3 %
MCH RBC QN AUTO: 31.5 PG (ref 26–34)
MCHC RBC AUTO-ENTMCNC: 34.5 G/DL (ref 31–36)
MCV RBC AUTO: 91.4 FL (ref 80–100)
MICROSCOPIC EXAMINATION: NORMAL
MONOCYTES ABSOLUTE: 1.3 K/UL (ref 0–1.3)
MONOCYTES RELATIVE PERCENT: 9.5 %
NEUTROPHILS ABSOLUTE: 11.5 K/UL (ref 1.7–7.7)
NEUTROPHILS RELATIVE PERCENT: 83.8 %
NITRITE, URINE: NEGATIVE
PDW BLD-RTO: 13.9 % (ref 12.4–15.4)
PH UA: 6 (ref 5–8)
PLATELET # BLD: 169 K/UL (ref 135–450)
PMV BLD AUTO: 8 FL (ref 5–10.5)
POTASSIUM REFLEX MAGNESIUM: 3.6 MMOL/L (ref 3.5–5.1)
PROTEIN UA: NEGATIVE MG/DL
RAPID INFLUENZA  B AGN: NEGATIVE
RAPID INFLUENZA A AGN: NEGATIVE
RBC # BLD: 4.32 M/UL (ref 4.2–5.9)
SODIUM BLD-SCNC: 133 MMOL/L (ref 136–145)
SPECIFIC GRAVITY UA: 1.01 (ref 1–1.03)
TOTAL PROTEIN: 7.5 G/DL (ref 6.4–8.2)
TROPONIN: <0.01 NG/ML
URINE REFLEX TO CULTURE: NORMAL
URINE TYPE: NORMAL
UROBILINOGEN, URINE: 0.2 E.U./DL
WBC # BLD: 13.8 K/UL (ref 4–11)

## 2019-03-15 PROCEDURE — 93010 ELECTROCARDIOGRAM REPORT: CPT | Performed by: INTERNAL MEDICINE

## 2019-03-15 PROCEDURE — 87804 INFLUENZA ASSAY W/OPTIC: CPT

## 2019-03-15 PROCEDURE — 99284 EMERGENCY DEPT VISIT MOD MDM: CPT

## 2019-03-15 PROCEDURE — 84484 ASSAY OF TROPONIN QUANT: CPT

## 2019-03-15 PROCEDURE — 96360 HYDRATION IV INFUSION INIT: CPT

## 2019-03-15 PROCEDURE — 93005 ELECTROCARDIOGRAM TRACING: CPT | Performed by: PHYSICIAN ASSISTANT

## 2019-03-15 PROCEDURE — 96361 HYDRATE IV INFUSION ADD-ON: CPT

## 2019-03-15 PROCEDURE — 80053 COMPREHEN METABOLIC PANEL: CPT

## 2019-03-15 PROCEDURE — 81003 URINALYSIS AUTO W/O SCOPE: CPT

## 2019-03-15 PROCEDURE — 2580000003 HC RX 258: Performed by: EMERGENCY MEDICINE

## 2019-03-15 PROCEDURE — 6370000000 HC RX 637 (ALT 250 FOR IP): Performed by: EMERGENCY MEDICINE

## 2019-03-15 PROCEDURE — 71045 X-RAY EXAM CHEST 1 VIEW: CPT

## 2019-03-15 PROCEDURE — 85025 COMPLETE CBC W/AUTO DIFF WBC: CPT

## 2019-03-15 RX ORDER — ACETAMINOPHEN 500 MG
1000 TABLET ORAL ONCE
Status: COMPLETED | OUTPATIENT
Start: 2019-03-15 | End: 2019-03-15

## 2019-03-15 RX ORDER — ONDANSETRON 4 MG/1
4 TABLET, ORALLY DISINTEGRATING ORAL EVERY 8 HOURS PRN
Qty: 20 TABLET | Refills: 0 | Status: SHIPPED | OUTPATIENT
Start: 2019-03-15 | End: 2020-10-12

## 2019-03-15 RX ORDER — 0.9 % SODIUM CHLORIDE 0.9 %
1000 INTRAVENOUS SOLUTION INTRAVENOUS ONCE
Status: COMPLETED | OUTPATIENT
Start: 2019-03-15 | End: 2019-03-15

## 2019-03-15 RX ADMIN — SODIUM CHLORIDE 1000 ML: 9 INJECTION, SOLUTION INTRAVENOUS at 02:28

## 2019-03-15 RX ADMIN — ACETAMINOPHEN 1000 MG: 500 TABLET ORAL at 02:28

## 2019-03-15 ASSESSMENT — PAIN SCALES - GENERAL: PAINLEVEL_OUTOF10: 0

## 2019-03-22 PROCEDURE — 87040 BLOOD CULTURE FOR BACTERIA: CPT

## 2019-03-22 PROCEDURE — 87801 DETECT AGNT MULT DNA AMPLI: CPT

## 2019-03-23 ENCOUNTER — HOSPITAL ENCOUNTER (INPATIENT)
Age: 84
LOS: 2 days | Discharge: HOME | DRG: 194 | End: 2019-03-25
Attending: EMERGENCY MEDICINE | Admitting: INTERNAL MEDICINE
Payer: MEDICARE

## 2019-03-23 ENCOUNTER — APPOINTMENT (OUTPATIENT)
Dept: CT IMAGING | Age: 84
DRG: 194 | End: 2019-03-23
Payer: MEDICARE

## 2019-03-23 DIAGNOSIS — J18.9 PNEUMONIA DUE TO ORGANISM: Primary | ICD-10-CM

## 2019-03-23 LAB
A/G RATIO: 1 (ref 1.1–2.2)
ALBUMIN SERPL-MCNC: 3.6 G/DL (ref 3.4–5)
ALP BLD-CCNC: 93 U/L (ref 40–129)
ALT SERPL-CCNC: 25 U/L (ref 10–40)
ANION GAP SERPL CALCULATED.3IONS-SCNC: 12 MMOL/L (ref 3–16)
AST SERPL-CCNC: 23 U/L (ref 15–37)
BASOPHILS ABSOLUTE: 0 K/UL (ref 0–0.2)
BASOPHILS RELATIVE PERCENT: 0.5 %
BILIRUB SERPL-MCNC: 0.5 MG/DL (ref 0–1)
BILIRUBIN URINE: NEGATIVE
BLOOD, URINE: NEGATIVE
BUN BLDV-MCNC: 14 MG/DL (ref 7–20)
CALCIUM SERPL-MCNC: 8.6 MG/DL (ref 8.3–10.6)
CHLORIDE BLD-SCNC: 99 MMOL/L (ref 99–110)
CLARITY: CLEAR
CO2: 27 MMOL/L (ref 21–32)
COLOR: YELLOW
CREAT SERPL-MCNC: 0.9 MG/DL (ref 0.8–1.3)
EOSINOPHILS ABSOLUTE: 0.4 K/UL (ref 0–0.6)
EOSINOPHILS RELATIVE PERCENT: 3.9 %
GFR AFRICAN AMERICAN: >60
GFR NON-AFRICAN AMERICAN: >60
GLOBULIN: 3.6 G/DL
GLUCOSE BLD-MCNC: 112 MG/DL (ref 70–99)
GLUCOSE URINE: NEGATIVE MG/DL
HCT VFR BLD CALC: 36.5 % (ref 40.5–52.5)
HEMOGLOBIN: 12.4 G/DL (ref 13.5–17.5)
KETONES, URINE: NEGATIVE MG/DL
LACTIC ACID: 1 MMOL/L (ref 0.4–2)
LEUKOCYTE ESTERASE, URINE: NEGATIVE
LYMPHOCYTES ABSOLUTE: 1.6 K/UL (ref 1–5.1)
LYMPHOCYTES RELATIVE PERCENT: 17 %
MCH RBC QN AUTO: 31 PG (ref 26–34)
MCHC RBC AUTO-ENTMCNC: 33.9 G/DL (ref 31–36)
MCV RBC AUTO: 91.5 FL (ref 80–100)
MICROSCOPIC EXAMINATION: NORMAL
MONOCYTES ABSOLUTE: 1.2 K/UL (ref 0–1.3)
MONOCYTES RELATIVE PERCENT: 12.4 %
NEUTROPHILS ABSOLUTE: 6.2 K/UL (ref 1.7–7.7)
NEUTROPHILS RELATIVE PERCENT: 66.2 %
NITRITE, URINE: NEGATIVE
PDW BLD-RTO: 13.5 % (ref 12.4–15.4)
PH UA: 7 (ref 5–8)
PLATELET # BLD: 266 K/UL (ref 135–450)
PMV BLD AUTO: 7.6 FL (ref 5–10.5)
POTASSIUM SERPL-SCNC: 3.8 MMOL/L (ref 3.5–5.1)
PROTEIN UA: NEGATIVE MG/DL
RAPID INFLUENZA  B AGN: NEGATIVE
RAPID INFLUENZA A AGN: NEGATIVE
RBC # BLD: 3.99 M/UL (ref 4.2–5.9)
SODIUM BLD-SCNC: 138 MMOL/L (ref 136–145)
SPECIFIC GRAVITY UA: <=1.005 (ref 1–1.03)
TOTAL PROTEIN: 7.2 G/DL (ref 6.4–8.2)
URINE REFLEX TO CULTURE: NORMAL
URINE TYPE: NORMAL
UROBILINOGEN, URINE: 0.2 E.U./DL
WBC # BLD: 9.4 K/UL (ref 4–11)

## 2019-03-23 PROCEDURE — 87801 DETECT AGNT MULT DNA AMPLI: CPT

## 2019-03-23 PROCEDURE — 94640 AIRWAY INHALATION TREATMENT: CPT

## 2019-03-23 PROCEDURE — 6360000004 HC RX CONTRAST MEDICATION: Performed by: EMERGENCY MEDICINE

## 2019-03-23 PROCEDURE — 83605 ASSAY OF LACTIC ACID: CPT

## 2019-03-23 PROCEDURE — 87040 BLOOD CULTURE FOR BACTERIA: CPT

## 2019-03-23 PROCEDURE — 81003 URINALYSIS AUTO W/O SCOPE: CPT

## 2019-03-23 PROCEDURE — 87804 INFLUENZA ASSAY W/OPTIC: CPT

## 2019-03-23 PROCEDURE — 96375 TX/PRO/DX INJ NEW DRUG ADDON: CPT

## 2019-03-23 PROCEDURE — 80053 COMPREHEN METABOLIC PANEL: CPT

## 2019-03-23 PROCEDURE — 6360000002 HC RX W HCPCS: Performed by: INTERNAL MEDICINE

## 2019-03-23 PROCEDURE — 92611 MOTION FLUOROSCOPY/SWALLOW: CPT

## 2019-03-23 PROCEDURE — 85025 COMPLETE CBC W/AUTO DIFF WBC: CPT

## 2019-03-23 PROCEDURE — 6370000000 HC RX 637 (ALT 250 FOR IP): Performed by: INTERNAL MEDICINE

## 2019-03-23 PROCEDURE — G8996 SWALLOW CURRENT STATUS: HCPCS

## 2019-03-23 PROCEDURE — 87641 MR-STAPH DNA AMP PROBE: CPT

## 2019-03-23 PROCEDURE — 94761 N-INVAS EAR/PLS OXIMETRY MLT: CPT

## 2019-03-23 PROCEDURE — 6360000002 HC RX W HCPCS: Performed by: EMERGENCY MEDICINE

## 2019-03-23 PROCEDURE — 99291 CRITICAL CARE FIRST HOUR: CPT

## 2019-03-23 PROCEDURE — 1200000000 HC SEMI PRIVATE

## 2019-03-23 PROCEDURE — 96372 THER/PROPH/DIAG INJ SC/IM: CPT

## 2019-03-23 PROCEDURE — 96367 TX/PROPH/DG ADDL SEQ IV INF: CPT

## 2019-03-23 PROCEDURE — 2580000003 HC RX 258: Performed by: EMERGENCY MEDICINE

## 2019-03-23 PROCEDURE — 2580000003 HC RX 258: Performed by: INTERNAL MEDICINE

## 2019-03-23 PROCEDURE — G8997 SWALLOW GOAL STATUS: HCPCS

## 2019-03-23 PROCEDURE — 96365 THER/PROPH/DIAG IV INF INIT: CPT

## 2019-03-23 PROCEDURE — 87449 NOS EACH ORGANISM AG IA: CPT

## 2019-03-23 PROCEDURE — 71260 CT THORAX DX C+: CPT

## 2019-03-23 RX ORDER — DIGOXIN 125 MCG
125 TABLET ORAL DAILY
Status: DISCONTINUED | OUTPATIENT
Start: 2019-03-23 | End: 2019-03-25 | Stop reason: HOSPADM

## 2019-03-23 RX ORDER — SODIUM CHLORIDE 9 MG/ML
INJECTION, SOLUTION INTRAVENOUS CONTINUOUS
Status: DISCONTINUED | OUTPATIENT
Start: 2019-03-23 | End: 2019-03-23

## 2019-03-23 RX ORDER — ATORVASTATIN CALCIUM 10 MG/1
10 TABLET, FILM COATED ORAL DAILY
Status: DISCONTINUED | OUTPATIENT
Start: 2019-03-23 | End: 2019-03-25 | Stop reason: HOSPADM

## 2019-03-23 RX ORDER — ACETAMINOPHEN 325 MG/1
650 TABLET ORAL EVERY 4 HOURS PRN
Status: DISCONTINUED | OUTPATIENT
Start: 2019-03-23 | End: 2019-03-25 | Stop reason: HOSPADM

## 2019-03-23 RX ORDER — IPRATROPIUM BROMIDE AND ALBUTEROL SULFATE 2.5; .5 MG/3ML; MG/3ML
1 SOLUTION RESPIRATORY (INHALATION) 2 TIMES DAILY
Status: DISCONTINUED | OUTPATIENT
Start: 2019-03-23 | End: 2019-03-25 | Stop reason: HOSPADM

## 2019-03-23 RX ORDER — ASPIRIN 81 MG/1
81 TABLET, CHEWABLE ORAL DAILY
Status: DISCONTINUED | OUTPATIENT
Start: 2019-03-23 | End: 2019-03-25 | Stop reason: HOSPADM

## 2019-03-23 RX ORDER — IPRATROPIUM BROMIDE AND ALBUTEROL SULFATE 2.5; .5 MG/3ML; MG/3ML
1 SOLUTION RESPIRATORY (INHALATION)
Status: DISCONTINUED | OUTPATIENT
Start: 2019-03-23 | End: 2019-03-23

## 2019-03-23 RX ORDER — AMLODIPINE BESYLATE 5 MG/1
5 TABLET ORAL DAILY
Status: DISCONTINUED | OUTPATIENT
Start: 2019-03-23 | End: 2019-03-24

## 2019-03-23 RX ORDER — CARVEDILOL 6.25 MG/1
12.5 TABLET ORAL 2 TIMES DAILY WITH MEALS
Status: DISCONTINUED | OUTPATIENT
Start: 2019-03-23 | End: 2019-03-25 | Stop reason: HOSPADM

## 2019-03-23 RX ORDER — SODIUM CHLORIDE 0.9 % (FLUSH) 0.9 %
10 SYRINGE (ML) INJECTION EVERY 12 HOURS SCHEDULED
Status: DISCONTINUED | OUTPATIENT
Start: 2019-03-23 | End: 2019-03-25 | Stop reason: HOSPADM

## 2019-03-23 RX ORDER — ALBUTEROL SULFATE 2.5 MG/3ML
2.5 SOLUTION RESPIRATORY (INHALATION)
Status: DISCONTINUED | OUTPATIENT
Start: 2019-03-23 | End: 2019-03-23

## 2019-03-23 RX ORDER — HYDRALAZINE HYDROCHLORIDE 20 MG/ML
10 INJECTION INTRAMUSCULAR; INTRAVENOUS EVERY 6 HOURS PRN
Status: DISCONTINUED | OUTPATIENT
Start: 2019-03-23 | End: 2019-03-25 | Stop reason: HOSPADM

## 2019-03-23 RX ORDER — ALBUTEROL SULFATE 2.5 MG/3ML
2.5 SOLUTION RESPIRATORY (INHALATION)
Status: DISCONTINUED | OUTPATIENT
Start: 2019-03-23 | End: 2019-03-25 | Stop reason: HOSPADM

## 2019-03-23 RX ORDER — ONDANSETRON 2 MG/ML
4 INJECTION INTRAMUSCULAR; INTRAVENOUS EVERY 6 HOURS PRN
Status: DISCONTINUED | OUTPATIENT
Start: 2019-03-23 | End: 2019-03-25 | Stop reason: HOSPADM

## 2019-03-23 RX ORDER — SODIUM CHLORIDE 0.9 % (FLUSH) 0.9 %
10 SYRINGE (ML) INJECTION PRN
Status: DISCONTINUED | OUTPATIENT
Start: 2019-03-23 | End: 2019-03-25 | Stop reason: HOSPADM

## 2019-03-23 RX ORDER — LISINOPRIL 20 MG/1
20 TABLET ORAL DAILY
Status: DISCONTINUED | OUTPATIENT
Start: 2019-03-23 | End: 2019-03-25 | Stop reason: HOSPADM

## 2019-03-23 RX ORDER — FUROSEMIDE 10 MG/ML
40 INJECTION INTRAMUSCULAR; INTRAVENOUS ONCE
Status: COMPLETED | OUTPATIENT
Start: 2019-03-23 | End: 2019-03-23

## 2019-03-23 RX ADMIN — ASPIRIN 81 MG 81 MG: 81 TABLET ORAL at 09:15

## 2019-03-23 RX ADMIN — IPRATROPIUM BROMIDE AND ALBUTEROL SULFATE 1 AMPULE: .5; 3 SOLUTION RESPIRATORY (INHALATION) at 06:26

## 2019-03-23 RX ADMIN — SODIUM CHLORIDE: 9 INJECTION, SOLUTION INTRAVENOUS at 05:10

## 2019-03-23 RX ADMIN — CEFTRIAXONE SODIUM 1 G: 1 INJECTION, POWDER, FOR SOLUTION INTRAMUSCULAR; INTRAVENOUS at 03:37

## 2019-03-23 RX ADMIN — IOPAMIDOL 75 ML: 755 INJECTION, SOLUTION INTRAVENOUS at 02:31

## 2019-03-23 RX ADMIN — LISINOPRIL 20 MG: 20 TABLET ORAL at 09:15

## 2019-03-23 RX ADMIN — IPRATROPIUM BROMIDE AND ALBUTEROL SULFATE 1 AMPULE: .5; 3 SOLUTION RESPIRATORY (INHALATION) at 19:00

## 2019-03-23 RX ADMIN — CARVEDILOL 12.5 MG: 6.25 TABLET, FILM COATED ORAL at 17:30

## 2019-03-23 RX ADMIN — Medication 10 ML: at 09:16

## 2019-03-23 RX ADMIN — ATORVASTATIN CALCIUM 10 MG: 10 TABLET, FILM COATED ORAL at 09:15

## 2019-03-23 RX ADMIN — Medication 10 ML: at 20:26

## 2019-03-23 RX ADMIN — AZITHROMYCIN MONOHYDRATE 500 MG: 500 INJECTION, POWDER, LYOPHILIZED, FOR SOLUTION INTRAVENOUS at 04:20

## 2019-03-23 RX ADMIN — CARVEDILOL 12.5 MG: 6.25 TABLET, FILM COATED ORAL at 09:15

## 2019-03-23 RX ADMIN — DIGOXIN 125 MCG: 125 TABLET ORAL at 09:15

## 2019-03-23 RX ADMIN — ENOXAPARIN SODIUM 40 MG: 100 INJECTION SUBCUTANEOUS at 09:15

## 2019-03-23 RX ADMIN — FUROSEMIDE 40 MG: 10 INJECTION, SOLUTION INTRAMUSCULAR; INTRAVENOUS at 09:16

## 2019-03-23 ASSESSMENT — PAIN DESCRIPTION - LOCATION: LOCATION: LEG

## 2019-03-23 ASSESSMENT — PAIN SCALES - GENERAL
PAINLEVEL_OUTOF10: 0

## 2019-03-23 NOTE — PLAN OF CARE
80 yom w/ generalized weakness, SOB, decreased level of activity. Found to have multifocal PNA on imaging.

## 2019-03-23 NOTE — H&P
Hospital Medicine History & Physical      PCP: Ashley Oconnor MD    Date of Admission: 3/23/2019    Date of Service: Pt seen/examined on 3/23/2019 and Admitted to Inpatient. Chief Complaint:  SOB, cough. History Of Present Illness: The patient is a 80 y.o. male w hx of chronic systolic CHF EF 43% improved to normal in 2015, HTN, HLD, CAD, who presents w SOB. Pt reports for about 7-8 days he has been having progressively worsening generalized weakness, associated with cough productive of yellow sputum. He is not sure about fever, but had chills and rigors. He came to ED about a week ago and was discharged home. He returned because symptoms were worsening - reports he stayed in bed all week. CT revealed RUL and RLL PNA and pt directed for admission. Past Medical History:        Diagnosis Date    Chronic systolic CHF (congestive heart failure) (HCC)     Heart disease     Hyperlipidemia     Hypertension        Past Surgical History:        Procedure Laterality Date    CATARACT REMOVAL WITH IMPLANT Left 3/16/16    EYE SURGERY Right 1/21/16    Cataract removal with implant Dr. Alba Alberto         Medications Prior to Admission:    Prior to Admission medications    Medication Sig Start Date End Date Taking?  Authorizing Provider   ondansetron (ZOFRAN ODT) 4 MG disintegrating tablet Take 1 tablet by mouth every 8 hours as needed for Nausea 3/15/19  Yes Shelly Castellano MD   amLODIPine (NORVASC) 5 MG tablet Take 1 tablet by mouth daily 1/22/19  Yes Rd Brian MD   carvedilol (COREG) 12.5 MG tablet Take 1 tablet by mouth 2 times daily 1/22/19  Yes Rd Brian MD   digoxin [facility]) 125 MCG tablet Take 1 tablet by mouth daily 1/22/19  Yes Rd Brian MD   lisinopril (PRINIVIL;ZESTRIL) 20 MG tablet Take 1 tablet by texture, turgor normal.  No rashes or lesions. Neurologic: Alert and oriented X 3, neurovascularly intact with sensory/motor intact upper extremities/lower extremities, bilaterally. Cranial nerves: II-XII intact, grossly non-focal.  Mental status: Alert, oriented, thought content appropriate. Capillary Refill: Acceptable  < 3 seconds  Peripheral Pulses: +3 Easily felt, not easily obliterated with pressure      CBC   Recent Labs     03/23/19 0147   WBC 9.4   HGB 12.4*   HCT 36.5*         RENAL  Recent Labs     03/23/19 0147      K 3.8   CL 99   CO2 27   BUN 14   CREATININE 0.9     LFT'S  Recent Labs     03/23/19 0147   AST 23   ALT 25   BILITOT 0.5   ALKPHOS 93     COAG  No results for input(s): INR in the last 72 hours. CARDIAC ENZYMES  No results for input(s): CKTOTAL, CKMB, CKMBINDEX, TROPONINI in the last 72 hours. U/A:    Lab Results   Component Value Date    COLORU Yellow 03/23/2019    CLARITYU Clear 03/23/2019    SPECGRAV <=1.005 03/23/2019    LEUKOCYTESUR Negative 03/23/2019    BLOODU Negative 03/23/2019    GLUCOSEU Negative 03/23/2019       ABG  No results found for: SRK6IWR, BEART, I3JPBIDL, PHART, THGBART, BMV0FWC, PO2ART, SWS1VWS        Active Hospital Problems    Diagnosis Date Noted    Multifocal pneumonia [J18.9] 03/23/2019         PHYSICIANS CERTIFICATION:    I certify that Teddy Owens is expected to be hospitalized for more than 2 midnights based on the following assessment and plan:      ASSESSMENT/PLAN:    CAP - organism unspecified. Check MRSA  Strep  Legionella  Sputum cult. Cover with zithro rocephin. Chronic syst CHF - appears compensated. .   Stop IVF. Give lasix IV today. Cont coreg, lipitor, dig, lisinopril, ASA. HTN uncontrolled  Stop IVF. Lasix IV. Cont PTA BP meds. DVT Prophylaxis: lovenox  Diet: DIET GENERAL;  Code Status: Full Code      Dispo - inpatient.         Magi Jack MD    Thank you Radha Alexandre MD for the opportunity to be involved in this patient's care. If you have any questions or concerns please feel free to contact me at [phone].

## 2019-03-23 NOTE — ED NOTES
2857- Consult placed to hospitalist Dr. Fabian Alford for Dr. Lazarus Mussel. 7735- Dr. Fabian Alford returned page and spoke to Dr. Lazarus Mussel.       Poly Sun  03/23/19 0328       Poly Sun  03/23/19 9991

## 2019-03-23 NOTE — DISCHARGE INSTR - COC
Continuity of Care Form    Patient Name: Kin James   :  1934  MRN:  [id number]    Admit date:  3/23/2019  Discharge date:  ***    Code Status Order: Full Code   Advance Directives:   Advance Care Flowsheet Documentation     Date/Time Healthcare Directive Type of Healthcare Directive Copy in [address] Agent's Name Healthcare Agent's Phone Number    [phone]  No, patient does not have an advance directive for healthcare treatment -- -- -- -- --          Admitting Physician:  Clari Peterson MD  PCP: Nathaniel Hawkins MD    Discharging Nurse: [facility] Unit/Room#: 0210/0210-02  Discharging Unit Phone Number: ***    Emergency Contact:   Extended Emergency Contact Information  Primary Emergency Contact: [facility]  Address: [address] [facility] [address]           [address] Home Phone: [phone]  Mobile Phone: [phone]  Relation: Child  Secondary Emergency Contact: Iván Rouse  Address: [address] Home Phone: [phone]  Relation: Child    Past Surgical History:  Past Surgical History:   Procedure Laterality Date    CATARACT REMOVAL WITH IMPLANT Left 3/16/16    EYE SURGERY Right 16    Cataract removal with implant Dr. Eladio Raygoza         Immunization History:   Immunization History   Administered Date(s) Administered    Pneumococcal 13-valent Conjugate (Susy Coreas) 2015       Active Problems:  Patient Active Problem List   Diagnosis Code    Chronic systolic CHF (congestive heart failure) ([address] I50.22    Cardiomyopathy ([facility] [address] I42.9    Hypertensive heart disease I11.9    Mitral regurgitation I34.0    Hypertension I10    Mixed hyperlipidemia E78.2    Multifocal pneumonia J18.9       Isolation/Infection:   Isolation          No Isolation            Nurse Assessment:  Last Vital Signs: BP Equipment (for information only, NOT a DME order):  {EQUIPMENT:764334784}  Other Treatments: ***    Patient's personal belongings (please select all that are sent with patient):  {CHP DME Belongings:485900823}    RN SIGNATURE:  {Esignature:759903654}    CASE MANAGEMENT/SOCIAL WORK SECTION    Inpatient Status Date: ***    Readmission Risk Assessment Score:  Readmission Risk              Risk of Unplanned Readmission:        11           Discharging to Facility/ Agency   · Name:   · Address:  · Phone:  · Fax:    Dialysis Facility (if applicable)   · Name:  · Address:  · Dialysis Schedule:  · Phone:  · Fax:    / signature: {Esignature:748943058}    PHYSICIAN SECTION    Prognosis: {Prognosis:4470258664}    Condition at Discharge: [address] [place] [address] Patient Condition:386254643}    Rehab Potential (if transferring to Rehab): {Prognosis:1597673691}    Recommended Labs or Other Treatments After Discharge: ***    Physician Certification: I certify the above information and transfer of Annette Filter  is necessary for the continuing treatment of the diagnosis listed and that he requires {Admit to Appropriate Level of Care:80334} for {GREATER/LESS:797560907} 30 days.      Update Admission H&P: {CHP DME Changes in PMAIO:383390986}    PHYSICIAN SIGNATURE:  {Esignature:208877598}

## 2019-03-23 NOTE — PROGRESS NOTES
Speech Language Pathology  Facility/Department: [facility]   BEDSIDE SWALLOW EVALUATION    NAME: Kale Adames  : 1934  MRN: [id number]    ADMISSION DATE: 3/23/2019  ADMITTING DIAGNOSIS: has Chronic systolic CHF (congestive heart failure) ([facility] [address] Cardiomyopathy ([facility] [address] Hypertensive heart disease; Mitral regurgitation; Hypertension; Mixed hyperlipidemia; and Multifocal pneumonia on their problem list.  ONSET DATE: 19    Recent Chest Xray/CT of Chest: (19)    1. Right upper lobe and right lower lobe nodular infiltrates probably   represent pneumonia. 2. Right apical pulmonary nodule.  Recommended follow-up: In a low-risk   patient, CT at 6-12 months, then consider CT at 18-24 months.  In a high-risk   patient, CT at 6-12 months, then CT at 18-24 months. 3. The smaller patchy opacities bilaterally could represent atelectasis or   pneumonia.         Date of Eval: 3/23/2019  Evaluating Therapist: Reji Flynn    Current Diet level:  Current Diet : Regular  Current Liquid Diet : Thin    Primary Complaint  Patient Complaint: No complaints. Pain:  Pain Assessment  Patient Currently in Pain: Denies  Pain Assessment: 0-10  Pain Level: 0  Pain Location: Leg  Patient's Stated Pain Goal: No pain    Reason for Referral  Kale Adames was referred for a bedside swallow evaluation to assess the efficiency of his swallow function, identify signs and symptoms of aspiration and make recommendations regarding safe dietary consistencies, effective compensatory strategies, and safe eating environment. Impression  Nursing indicated that it was OK for SLP to see patient. Nursing reported that patient ate entire breakfast with no s/s of aspiration at B/S. SLP trailed puree, soft solid, solid and thin liquid via cup and straw. Patient demonstrated no s/s of aspiration at B/S.   SLP spoke with Nurse, Lashonda Anderson, and indicated that if patient begins to have difficulty with PO intake to make patient NPO for the weekend. If pneumonia does not clear, recommend MBSS procedure to rule out silent aspiration. Dysphagia Diagnosis: Swallow function appears grossly intact  Dysphagia Outcome Severity Scale: Level 6: Within functional limits/Modified independence     Treatment Plan  Requires SLP Intervention: Yes  Duration/Frequency of Treatment: ST tx 2-3x/week for LOS  D/C Recommendations: To be determined       Recommended Diet and Intervention  Diet Solids Recommendation: Regular  Liquid Consistency Recommendation: Thin  Recommended Form of Meds: Whole with water  Therapeutic Interventions: Patient/Family education;Diet tolerance monitoring    Compensatory Swallowing Strategies  Compensatory Swallowing Strategies: Alternate solids and liquids;Small bites/sips;Eat/Feed slowly; Remain upright for 30-45 minutes after meals;Upright as possible for all oral intake    Treatment/Goals  Dysphagia Goals: The patient will tolerate recommended diet without observed clinical signs of aspiration; The patient/caregiver will demonstrate understanding of compensatory strategies for improved swallowing safety. General  Chart Reviewed: Yes  Comments: Nursing reported no difficulties with breakfast this morning. Subjective  Subjective: Patient was alert and cooperative and indicated no choking or choking with meals at home. Behavior/Cognition: Alert; Cooperative  Breath Sounds: Clear  O2 Device: None (Room air)  Communication Observation: Functional  Follows Directions: Simple  Dentition: Edentulous  Patient Positioning: Upright in bed  Baseline Vocal Quality: Normal  Volitional Cough: Strong  Prior Dysphagia History: No prior dysphagia history. Consistencies Administered: Reg solid; Soft solid;Puree; Thin - cup; Thin - straw     Vision/Hearing  Vision  Vision: Within Functional Limits  Hearing  Hearing: Within functional limits    Oral Motor Deficits  Oral/Motor  Oral Motor:  Within functional limits    Oral Phase

## 2019-03-23 NOTE — PROGRESS NOTES
RESPIRATORY THERAPY ASSESSMENT    [facility]:  [id number]  Age: 80 y.o. Gender: male  : 1934  Today's Date:  3/23/2019  Room:  [address] [place] [address]-    Assessment     Is the patient being admitted for a COPD or Asthma exacerbation? No   (If yes the patient will be seen every 4 hours for the first 24 hours and then reassessed)    Patient Admission Diagnosis      Allergies  No Known Allergies    Minimum Predicted Vital Capacity:     843          Actual Vital Capacity:      1400              Pulmonary History:CHF/Pulmonary Edema  Home Oxygen Therapy:  room air  Home Respiratory Therapy:None   Current Respiratory Therapy:  duoneb Q4WA          Respiratory Severity Index(RSI)   Patients with orders for inhalation medications, oxygen, or any therapeutic treatment modality will be placed on Respiratory Protocol. They will be assessed with the first treatment and at least every 72 hours thereafter. The following severity scale will be used to determine frequency of treatment intervention.     Smoking History: Smoking History Less than 1ppd or less than 15 pack year = 1    Social History  Social History     Tobacco Use    Smoking status: Former Smoker    Smokeless tobacco: Never Used   Substance Use Topics    Alcohol use: No    Drug use: No       Recent Surgical History: None=0  Past Surgical History  Past Surgical History:   Procedure Laterality Date    CATARACT REMOVAL WITH IMPLANT Left 3/16/16    EYE SURGERY Right 16    Cataract removal with implant Dr. Sunshine Mata         Level of Consciousness: Alert, Oriented, and Cooperative = 0    Level of Activity: Mostly sedentary, minimal walking = 2    Respiratory Pattern: Dyspnea with exertion;Irregular pattern;or RR less than 6 = 2    Breath Sounds: Diminshed bilaterally and/or crackles = 2    Sputum   ,  ,    Cough: Strong, spontaneous, non-productive = 0    Vital Signs   BP (!) 171/64   Pulse 57   Temp 98.2 °F

## 2019-03-23 NOTE — ED PROVIDER NOTES
Triage Chief Complaint:    Cough (pt states that he was seen last thursday into friday for the same thing and has gotten no better; states that he is coughing some blood now) and Fatigue (pt states that he has been in bed for a week)    [place]atinum:  Kaci Hays is a 80 y.o. male that presents to the [address] with complaints of having cough and cold symptoms. The patient was seen here over 8 days ago, and was sent home due to the fact that we felt he had a viral infection at that time. The patient had been given medication for his symptoms, and he was taking his medications over the course the last week, without significant relief. Patient states that he started having increasing weakness tonight, and family is concerned because he is having difficulty walking and speaking, and seems short of breath. The patient was brought in for further evaluation. Patient denies any back pain or chest pain. Patient states he feels as though he has an infection. ROS:  At least  10 systems reviewed and otherwise acutely negative except as in the [address] Past Medical History:   Diagnosis Date    Chronic systolic CHF (congestive heart failure) (HCC)     Heart disease     Hyperlipidemia     Hypertension      Past Surgical History:   Procedure Laterality Date    CATARACT REMOVAL WITH IMPLANT Left 3/16/16    EYE SURGERY Right 1/21/16    Cataract removal with implant Dr. Amanda Metcalf History   Problem Relation Age of Onset    Heart Disease Mother     High Blood Pressure Mother     Heart Disease Father     High Blood Pressure Father     Diabetes Brother     Heart Disease Brother     Stroke Brother      Social History     Socioeconomic History    Marital status:       Spouse name: Not on file    Number of children: Not on file    Years of education: Not on file    Highest education level: Not on file   Occupational History    Not on file   Social Needs    Financial resource strain: Not on file    Food insecurity:     Worry: Not on file     Inability: Not on file    Transportation needs:     Medical: Not on file     Non-medical: Not on file   Tobacco Use    Smoking status: Former Smoker    Smokeless tobacco: Never Used   Substance and Sexual Activity    Alcohol use: No    Drug use: No    Sexual activity: Not Currently   Lifestyle    Physical activity:     Days per week: Not on file     Minutes per session: Not on file    Stress: Not on file   Relationships    Social connections:     Talks on phone: Not on file     Gets together: Not on file     Attends Hindu service: Not on file     Active member of club or organization: Not on file     Attends meetings of clubs or organizations: Not on file     Relationship status: Not on file    Intimate partner violence:     Fear of current or ex partner: Not on file     Emotionally abused: Not on file     Physically abused: Not on file     Forced sexual activity: Not on file   Other Topics Concern    Not on file   Social History Narrative    Not on file     Current Facility-Administered Medications   Medication Dose Route Frequency Provider Last Rate Last Dose    cefTRIAXone (ROCEPHIN) 1 g IVPB in 50 mL D5W minibag  1 g Intravenous Once Michael Dominguez MD        azithromycin (ZITHROMAX) 500 mg in D5W 250ml addavial  500 mg Intravenous Once Michael Dominguez MD         Current Outpatient Medications   Medication Sig Dispense Refill    ondansetron (ZOFRAN ODT) 4 MG disintegrating tablet Take 1 tablet by mouth every 8 hours as needed for Nausea 20 tablet 0    amLODIPine (NORVASC) 5 MG tablet Take 1 tablet by mouth daily 90 tablet 3    carvedilol (COREG) 12.5 MG tablet Take 1 tablet by mouth 2 times daily 180 tablet 3    digoxin (LANOXIN) 125 MCG tablet Take 1 tablet by mouth daily 90 tablet 3    lisinopril (PRINIVIL;ZESTRIL) 20 MG tablet Take 1 tablet by mouth daily 90 tablet 3    atorvastatin (LIPITOR) 10 MG tablet Take 1 tablet by Metabolic Panel   Result Value Ref Range    Sodium 138 136 - 145 mmol/L    Potassium 3.8 3.5 - 5.1 mmol/L    Chloride 99 99 - 110 mmol/L    CO2 27 21 - 32 mmol/L    Anion Gap 12 3 - 16    Glucose 112 (H) 70 - 99 mg/dL    BUN 14 7 - 20 mg/dL    CREATININE 0.9 0.8 - 1.3 mg/dL    GFR Non-African American >60 >60    GFR African American >60 >60    Calcium 8.6 8.3 - 10.6 mg/dL    Total Protein 7.2 6.4 - 8.2 g/dL    Alb 3.6 3.4 - 5.0 g/dL    Albumin/Globulin Ratio 1.0 (L) 1.1 - 2.2    Total Bilirubin 0.5 0.0 - 1.0 mg/dL    Alkaline Phosphatase 93 40 - 129 U/L    ALT 25 10 - 40 U/L    AST 23 15 - 37 U/L    Globulin 3.6 g/dL   Urinalysis Reflex to Culture   Result Value Ref Range    Color, UA Yellow Straw/Yellow    Clarity, UA Clear Clear    Glucose, Ur Negative Negative mg/dL    Bilirubin Urine Negative Negative    Ketones, Urine Negative Negative mg/dL    Specific Gravity, UA <=1.005 1.005 - 1.030    Blood, Urine Negative Negative    pH, UA 7.0 5.0 - 8.0    Protein, UA Negative Negative mg/dL    Urobilinogen, Urine 0.2 <2.0 E.U./dL    Nitrite, Urine Negative Negative    Leukocyte Esterase, Urine Negative Negative    Microscopic Examination Not Indicated     Urine Reflex to Culture Not Indicated     Urine Type Not Specified    Lactic Acid, Plasma   Result Value Ref Range    Lactic Acid 1.0 0.4 - 2.0 mmol/L        Radiographs (if obtained):  [] The following radiograph was interpreted by myself in the absence of a radiologist:  [x] Radiologist's Report Reviewed:  CT CHEST W CONTRAST   Final Result   1. Right upper lobe and right lower lobe nodular infiltrates probably   represent pneumonia. 2. Right apical pulmonary nodule. Recommended follow-up: In a low-risk   patient, CT at 6-12 months, then consider CT at 18-24 months. In a high-risk   patient, CT at 6-12 months, then CT at 18-24 months. 3. The smaller patchy opacities bilaterally could represent atelectasis or   pneumonia.              EKG (if obtained): (All EKG's are interpreted by myself in theabsence of a cardiologist)    Procedures    MDM:   After my initial evaluation, the patient does have a benign physical examination. I did see the patient last time, and we did work him up with the only abnormality being that the patient had an elevation of his white blood cell count. The patient started to feel better, according to family, but then in the last 2 days started to get worse. Patient could not exactly say what was worse, except that he has cough and cold symptoms and he just \"feels terrible\". Patient's blood work does demonstrate a normal blood cell count with a normal lactic acid, and normal comprehensive metabolic profile. The patient was sent for a CT scan of his chest by me, and this does show nodular densities in multiple lobes, which represents pneumonia. This point I am did admit the patient for IV antibiotics after the patient had blood cultures obtained. Patient is maintaining good oxygen saturations, but because he has a multifocal pneumonia, I do feel he requires IV antibiotics. Critical care time provided of 33 minutes, excluding any previously dictated procedures. This time is being requested for physical examination, laboratory interpretation, medical intervention, frequent reassessments, and charting. Clinical Impression:  1.  Pneumonia due to organism      (Please note that portions of this note Rick Leslie been completed with a voice recognition program. Efforts were made to edit the dictations but occasionally words are mis-transcribed.)    MD Jeanette Joyner MD  03/23/19 1780

## 2019-03-23 NOTE — CARE COORDINATION
Case Management Assessment  Initial Evaluation    Date/Time of Evaluation: 3/23/2019 10:43 AM  Assessment Completed by: Joanna Roach    Patient Name: Jed Foley  YOB: 1934  Diagnosis: Multifocal pneumonia [J18.9]  Date / Time: 3/23/2019  1:23 AM  Admission status/Date:inpatient 03/23/19  Chart Reviewed: Yes      Patient Interviewed: Yes   Family Interviewed:  No      Hospitalization in the last 30 days:  No    Contacts  :     Relationship to Patient:   Phone Number:    Alternate Contact:     Relationship to Patient:     Phone Number:    Met with:    Current PCP  Kala Finch MD      Financial  Commercial  Precert required for SNF : [facility]        3 night stay required - Y, N    ADLS  Support Systems: Family Members  Transportation: self    Meal Preparation: self    Housing  Home Environment: mobile home alone  Steps: ramp  Plans to Return to Present Housing: Yes  Other Identified Issues: no    Home Care Information  Currently active with [address] : No  Type of Home Care Services: None  Passport/Waiver : No  :                      Phone Number:    Passport/Waiver Services: no          Durable Medical Equipment   DME Provider: n/a  Equipment: Walker_x_Cane_x_RTS__ BSC__Shower Chair__  02__ HHN__ CPAP__  BiPap__  Hospital Bed__ W/C___ Other__________      Has Home O2 in place on admit:  No  Informed of need to bring portable home O2 tank on day of discharge for nursing to connect prior to leaving:   Not Indicated  Verbalized agreement/Understanding:   Not Indicated    Community Service Affiliation  Dialysis:  No    · Name:  · Location  · Dialysis Schedule:  · Phone:   · Fax: Outpatient PT/OT: No    Cancer Center: No     CHF Clinic: No     Pulmonary Rehab: No  Pain Clinic: No  Community Mental Health: No    Wound Clinic: No     Other: no    DISCHARGE PLAN: Reviewed Chart. Met with the pt. Role of dcp explained.  Pt from mobile home alone with son lives on the same street. Pt states son plans to stay with him at d/c. Follow for possible hhc,+eCOC. Explained Case Management role/services.

## 2019-03-23 NOTE — PROGRESS NOTES
Pt refused clear liquid diet,says he ate rosales and eggs yesterday morning. Diet advanced to regular.

## 2019-03-23 NOTE — PROGRESS NOTES
Assessment complete. Aron is alert and oriented. Vital signs are per flowsheet. Respirations are easy and even at rest.  Aron denies any pain or discomfort at this time. Aron is up in bed watching television, 2/4 side rails up, bed in lowest position, call light in easy reach, bed alarm off, and he denies any further needs. No other complications are noted, will continue to monitor throughout shift.

## 2019-03-24 LAB
A/G RATIO: 0.8 (ref 1.1–2.2)
ALBUMIN SERPL-MCNC: 3.3 G/DL (ref 3.4–5)
ALP BLD-CCNC: 77 U/L (ref 40–129)
ALT SERPL-CCNC: 24 U/L (ref 10–40)
ANION GAP SERPL CALCULATED.3IONS-SCNC: 10 MMOL/L (ref 3–16)
AST SERPL-CCNC: 20 U/L (ref 15–37)
BASOPHILS ABSOLUTE: 0.1 K/UL (ref 0–0.2)
BASOPHILS RELATIVE PERCENT: 0.6 %
BILIRUB SERPL-MCNC: 0.5 MG/DL (ref 0–1)
BUN BLDV-MCNC: 18 MG/DL (ref 7–20)
CALCIUM SERPL-MCNC: 8.9 MG/DL (ref 8.3–10.6)
CHLORIDE BLD-SCNC: 100 MMOL/L (ref 99–110)
CO2: 29 MMOL/L (ref 21–32)
CREAT SERPL-MCNC: 1 MG/DL (ref 0.8–1.3)
EOSINOPHILS ABSOLUTE: 0.3 K/UL (ref 0–0.6)
EOSINOPHILS RELATIVE PERCENT: 3.9 %
GFR AFRICAN AMERICAN: >60
GFR NON-AFRICAN AMERICAN: >60
GLOBULIN: 3.9 G/DL
GLUCOSE BLD-MCNC: 100 MG/DL (ref 70–99)
HCT VFR BLD CALC: 38.2 % (ref 40.5–52.5)
HEMOGLOBIN: 13.1 G/DL (ref 13.5–17.5)
L. PNEUMOPHILA SEROGP 1 UR AG: NORMAL
LYMPHOCYTES ABSOLUTE: 1.3 K/UL (ref 1–5.1)
LYMPHOCYTES RELATIVE PERCENT: 14.7 %
MAGNESIUM: 2.1 MG/DL (ref 1.8–2.4)
MCH RBC QN AUTO: 31.3 PG (ref 26–34)
MCHC RBC AUTO-ENTMCNC: 34.3 G/DL (ref 31–36)
MCV RBC AUTO: 91.2 FL (ref 80–100)
MONOCYTES ABSOLUTE: 1 K/UL (ref 0–1.3)
MONOCYTES RELATIVE PERCENT: 11.3 %
MRSA SCREEN RT-PCR: NORMAL
NEUTROPHILS ABSOLUTE: 6.1 K/UL (ref 1.7–7.7)
NEUTROPHILS RELATIVE PERCENT: 69.5 %
PDW BLD-RTO: 13.6 % (ref 12.4–15.4)
PLATELET # BLD: 260 K/UL (ref 135–450)
PMV BLD AUTO: 7.6 FL (ref 5–10.5)
POTASSIUM REFLEX MAGNESIUM: 3.5 MMOL/L (ref 3.5–5.1)
RBC # BLD: 4.19 M/UL (ref 4.2–5.9)
REPORT: NORMAL
SODIUM BLD-SCNC: 139 MMOL/L (ref 136–145)
TOTAL PROTEIN: 7.2 G/DL (ref 6.4–8.2)
WBC # BLD: 8.8 K/UL (ref 4–11)

## 2019-03-24 PROCEDURE — 1200000000 HC SEMI PRIVATE

## 2019-03-24 PROCEDURE — 6360000002 HC RX W HCPCS: Performed by: INTERNAL MEDICINE

## 2019-03-24 PROCEDURE — 83735 ASSAY OF MAGNESIUM: CPT

## 2019-03-24 PROCEDURE — 96366 THER/PROPH/DIAG IV INF ADDON: CPT

## 2019-03-24 PROCEDURE — 94761 N-INVAS EAR/PLS OXIMETRY MLT: CPT

## 2019-03-24 PROCEDURE — 6370000000 HC RX 637 (ALT 250 FOR IP): Performed by: INTERNAL MEDICINE

## 2019-03-24 PROCEDURE — 36415 COLL VENOUS BLD VENIPUNCTURE: CPT

## 2019-03-24 PROCEDURE — 85025 COMPLETE CBC W/AUTO DIFF WBC: CPT

## 2019-03-24 PROCEDURE — 2580000003 HC RX 258: Performed by: INTERNAL MEDICINE

## 2019-03-24 PROCEDURE — 96372 THER/PROPH/DIAG INJ SC/IM: CPT

## 2019-03-24 PROCEDURE — 80053 COMPREHEN METABOLIC PANEL: CPT

## 2019-03-24 PROCEDURE — 94640 AIRWAY INHALATION TREATMENT: CPT

## 2019-03-24 RX ORDER — AMLODIPINE BESYLATE 5 MG/1
10 TABLET ORAL DAILY
Status: DISCONTINUED | OUTPATIENT
Start: 2019-03-24 | End: 2019-03-25 | Stop reason: HOSPADM

## 2019-03-24 RX ADMIN — IPRATROPIUM BROMIDE AND ALBUTEROL SULFATE 1 AMPULE: .5; 3 SOLUTION RESPIRATORY (INHALATION) at 07:15

## 2019-03-24 RX ADMIN — CEFTRIAXONE SODIUM 1 G: 1 INJECTION, POWDER, FOR SOLUTION INTRAMUSCULAR; INTRAVENOUS at 02:03

## 2019-03-24 RX ADMIN — AZITHROMYCIN MONOHYDRATE 500 MG: 500 INJECTION, POWDER, LYOPHILIZED, FOR SOLUTION INTRAVENOUS at 03:03

## 2019-03-24 RX ADMIN — AMLODIPINE BESYLATE 10 MG: 5 TABLET ORAL at 09:23

## 2019-03-24 RX ADMIN — CARVEDILOL 12.5 MG: 6.25 TABLET, FILM COATED ORAL at 09:24

## 2019-03-24 RX ADMIN — LISINOPRIL 20 MG: 20 TABLET ORAL at 09:24

## 2019-03-24 RX ADMIN — ATORVASTATIN CALCIUM 10 MG: 10 TABLET, FILM COATED ORAL at 09:24

## 2019-03-24 RX ADMIN — ENOXAPARIN SODIUM 40 MG: 100 INJECTION SUBCUTANEOUS at 09:25

## 2019-03-24 RX ADMIN — CARVEDILOL 12.5 MG: 6.25 TABLET, FILM COATED ORAL at 16:09

## 2019-03-24 RX ADMIN — IPRATROPIUM BROMIDE AND ALBUTEROL SULFATE 1 AMPULE: .5; 3 SOLUTION RESPIRATORY (INHALATION) at 20:00

## 2019-03-24 RX ADMIN — Medication 10 ML: at 09:26

## 2019-03-24 RX ADMIN — DIGOXIN 125 MCG: 125 TABLET ORAL at 09:23

## 2019-03-24 RX ADMIN — ASPIRIN 81 MG 81 MG: 81 TABLET ORAL at 09:23

## 2019-03-24 ASSESSMENT — PAIN SCALES - GENERAL
PAINLEVEL_OUTOF10: 0

## 2019-03-24 NOTE — PROGRESS NOTES
Dr. Angela Perez made aware of pt request for cough suppressant, no new interventions RN to continue teaching pulmonary toileting.

## 2019-03-24 NOTE — PROGRESS NOTES
Aron moved to different room. Assessment complete. Aron is alert and oriented. Vital signs are per flowsheet. Respirations are easy and even at rest.  Aron denies any pain or discomfort at this time. Aron is up in bed watching television, 2/4 side rails up, bed in lowest position, call light in easy reach, bed alarm off, and he denies any further needs. No other complications are noted, will continue to monitor throughout shift.

## 2019-03-24 NOTE — PROGRESS NOTES
Hospitalist Progress Note      PCP: Merrick Williamson MD    Date of Admission: 3/23/2019    Chief Complaint: cough, SOB. Subjective: ongoing productive cough. SOB improved, exertional dyspnea much better. On RA. Medications:  Reviewed    Infusion Medications   Scheduled Medications    amLODIPine  10 mg Oral Daily    aspirin  81 mg Oral Daily    atorvastatin  10 mg Oral Daily    carvedilol  12.5 mg Oral BID WC    digoxin  125 mcg Oral Daily    lisinopril  20 mg Oral Daily    sodium chloride flush  10 mL Intravenous 2 times per day    enoxaparin  40 mg Subcutaneous Daily    azithromycin  500 mg Intravenous Q24H    And    cefTRIAXone (ROCEPHIN) IV  1 g Intravenous Q24H    ipratropium-albuterol  1 ampule Inhalation BID     PRN Meds: hydrALAZINE, sodium chloride flush, magnesium hydroxide, ondansetron, acetaminophen, albuterol      Intake/Output Summary (Last 24 hours) at 3/24/2019 0917  Last data filed at 3/24/2019 0842  Gross per 24 hour   Intake 980 ml   Output 450 ml   Net 530 ml       Physical Exam Performed:    BP (!) 164/72   Pulse 59   Temp 96.7 °F (35.9 °C) (Oral)   Resp 16   Ht 5' 3\" (1.6 m)   Wt 132 lb 6.4 oz (60.1 kg)   SpO2 97%   BMI 23.45 kg/m²     General appearance: No apparent distress appears stated age and cooperative. HEENT Normal cephalic, atraumatic without obvious deformity. Pupils equal, round, and reactive to light. Extra ocular muscles intact. Conjunctivae/corneas clear. Neck: Supple, No jugular venous distention/bruits. Trachea midline without thyromegaly or adenopathy with full range of motion. Lungs: Clear to auscultation, bilaterally without Rales/Wheezes/Rhonchi with good respiratory effort.   Heart: Regular rate and rhythm with Normal S1/S2 without murmurs, rubs or gallops, point of maximum impulse non-displaced  Abdomen: Soft, non-tender or non-distended without rigidity or guarding and positive bowel sounds all four    Chronic syst CHF - appears compensated. .   Stop IVF. Given lasix IV 3/23. He appears euvolemic today. Cont coreg, lipitor, dig, lisinopril, ASA.        HTN uncontrolled  Stopped IVF.    Cont PTA BP meds.    Increase amlodipine.        DVT Prophylaxis: lovenox  Diet: DIET GENERAL;  Code Status: Full Code        Dispo - inpatient.          Evan Pimentel MD

## 2019-03-24 NOTE — PROGRESS NOTES
Patient's EF (Ejection Fraction) is greater than 40%    Patient has a past medical history of Chronic systolic CHF (congestive heart failure) ([address] Heart disease, Hyperlipidemia, and Hypertension. Comorbidities reviewed and education provided as appropriate. Patient and/or family's stated goal of care: reduce shortness of breath prior to discharge, better understand heart failure and disease management prior to discharge, reduce lower extremity edema prior to discharge, comfort care measures only and unable to assess at this time    Pt is currently RA. Pt without lower extremity edema. Patient's weights and intake/output reviewed:    Patient Vitals for the past 96 hrs (Last 3 readings):   Weight   03/23/19 0446 132 lb 6.4 oz (60.1 kg)   03/23/19 0133 122 lb (55.3 kg)       Intake/Output Summary (Last 24 hours) at 3/24/2019 1225  Last data filed at 3/24/2019 0923  Gross per 24 hour   Intake 990 ml   Output 450 ml   Net 540 ml         Patient's current functional capacity:  No limitation of physical activity. Ordinary physical activity does not cause undue fatigue, palpitation, dyspnea.      >> For CHF and Comorbidity Education Time and Topics, please see Education Tab.

## 2019-03-25 ENCOUNTER — TELEPHONE (OUTPATIENT)
Dept: INTERNAL MEDICINE CLINIC | Age: 84
End: 2019-03-25

## 2019-03-25 VITALS
SYSTOLIC BLOOD PRESSURE: 142 MMHG | BODY MASS INDEX: 23.48 KG/M2 | DIASTOLIC BLOOD PRESSURE: 70 MMHG | HEIGHT: 63 IN | WEIGHT: 132.5 LBS | HEART RATE: 57 BPM | RESPIRATION RATE: 17 BRPM | TEMPERATURE: 97.2 F | OXYGEN SATURATION: 96 %

## 2019-03-25 LAB — STREP PNEUMONIAE ANTIGEN, URINE: NORMAL

## 2019-03-25 PROCEDURE — 92526 ORAL FUNCTION THERAPY: CPT

## 2019-03-25 PROCEDURE — 96372 THER/PROPH/DIAG INJ SC/IM: CPT

## 2019-03-25 PROCEDURE — 6370000000 HC RX 637 (ALT 250 FOR IP): Performed by: INTERNAL MEDICINE

## 2019-03-25 PROCEDURE — 99238 HOSP IP/OBS DSCHRG MGMT 30/<: CPT | Performed by: INTERNAL MEDICINE

## 2019-03-25 PROCEDURE — 94761 N-INVAS EAR/PLS OXIMETRY MLT: CPT

## 2019-03-25 PROCEDURE — 94640 AIRWAY INHALATION TREATMENT: CPT

## 2019-03-25 PROCEDURE — 2580000003 HC RX 258: Performed by: INTERNAL MEDICINE

## 2019-03-25 PROCEDURE — 6360000002 HC RX W HCPCS: Performed by: INTERNAL MEDICINE

## 2019-03-25 PROCEDURE — 96366 THER/PROPH/DIAG IV INF ADDON: CPT

## 2019-03-25 RX ORDER — AZITHROMYCIN 250 MG/1
500 TABLET, FILM COATED ORAL DAILY
Status: DISCONTINUED | OUTPATIENT
Start: 2019-03-26 | End: 2019-03-25 | Stop reason: HOSPADM

## 2019-03-25 RX ORDER — AZITHROMYCIN 250 MG/1
250 TABLET, FILM COATED ORAL DAILY
Qty: 3 TABLET | Refills: 0 | Status: SHIPPED | OUTPATIENT
Start: 2019-03-25 | End: 2019-03-28

## 2019-03-25 RX ORDER — CEFDINIR 300 MG/1
300 CAPSULE ORAL 2 TIMES DAILY
Qty: 12 CAPSULE | Refills: 0 | Status: SHIPPED | OUTPATIENT
Start: 2019-03-25 | End: 2019-03-31

## 2019-03-25 RX ADMIN — ENOXAPARIN SODIUM 40 MG: 100 INJECTION SUBCUTANEOUS at 08:34

## 2019-03-25 RX ADMIN — ATORVASTATIN CALCIUM 10 MG: 10 TABLET, FILM COATED ORAL at 08:33

## 2019-03-25 RX ADMIN — CEFTRIAXONE SODIUM 1 G: 1 INJECTION, POWDER, FOR SOLUTION INTRAMUSCULAR; INTRAVENOUS at 02:40

## 2019-03-25 RX ADMIN — Medication 10 ML: at 08:33

## 2019-03-25 RX ADMIN — IPRATROPIUM BROMIDE AND ALBUTEROL SULFATE 1 AMPULE: .5; 3 SOLUTION RESPIRATORY (INHALATION) at 07:33

## 2019-03-25 RX ADMIN — AMLODIPINE BESYLATE 10 MG: 5 TABLET ORAL at 08:33

## 2019-03-25 RX ADMIN — HYDRALAZINE HYDROCHLORIDE 10 MG: 20 INJECTION INTRAMUSCULAR; INTRAVENOUS at 08:42

## 2019-03-25 RX ADMIN — LISINOPRIL 20 MG: 20 TABLET ORAL at 08:34

## 2019-03-25 RX ADMIN — AZITHROMYCIN MONOHYDRATE 500 MG: 500 INJECTION, POWDER, LYOPHILIZED, FOR SOLUTION INTRAVENOUS at 02:41

## 2019-03-25 RX ADMIN — ASPIRIN 81 MG 81 MG: 81 TABLET ORAL at 08:34

## 2019-03-25 RX ADMIN — DIGOXIN 125 MCG: 125 TABLET ORAL at 08:33

## 2019-03-25 RX ADMIN — CARVEDILOL 12.5 MG: 6.25 TABLET, FILM COATED ORAL at 08:34

## 2019-03-25 ASSESSMENT — PAIN SCALES - GENERAL
PAINLEVEL_OUTOF10: 0

## 2019-03-25 NOTE — PROGRESS NOTES
Hospitalist Progress Note      PCP: Silvina Maguire MD    Date of Admission: 3/23/2019    Chief Complaint: cough, SOB. Subjective: ongoing productive cough. SOB improved, exertional dyspnea much better. On RA. Feeling much better       Medications:  Reviewed    Infusion Medications   Scheduled Medications    amLODIPine  10 mg Oral Daily    aspirin  81 mg Oral Daily    atorvastatin  10 mg Oral Daily    carvedilol  12.5 mg Oral BID WC    digoxin  125 mcg Oral Daily    lisinopril  20 mg Oral Daily    sodium chloride flush  10 mL Intravenous 2 times per day    enoxaparin  40 mg Subcutaneous Daily    azithromycin  500 mg Intravenous Q24H    And    cefTRIAXone (ROCEPHIN) IV  1 g Intravenous Q24H    ipratropium-albuterol  1 ampule Inhalation BID     PRN Meds: hydrALAZINE, sodium chloride flush, magnesium hydroxide, ondansetron, acetaminophen, albuterol      Intake/Output Summary (Last 24 hours) at 3/25/2019 0921  Last data filed at 3/24/2019 1750  Gross per 24 hour   Intake 490 ml   Output --   Net 490 ml       Physical Exam Performed:    BP (!) 172/73   Pulse 65   Temp 97 °F (36.1 °C) (Oral)   Resp 18   Ht 5' 3\" (1.6 m)   Wt 132 lb 8 oz (60.1 kg)   SpO2 97%   BMI 23.47 kg/m²     General appearance: No apparent distress appears stated age and cooperative. HEENT Normal cephalic, atraumatic without obvious deformity. Pupils equal, round, and reactive to light. Extra ocular muscles intact. Conjunctivae/corneas clear. Neck: Supple, No jugular venous distention/bruits. Trachea midline without thyromegaly or adenopathy with full range of motion. Lungs: Clear to auscultation, bilaterally without Rales/Wheezes/Rhonchi with good respiratory effort.   Heart: Regular rate and rhythm with Normal S1/S2 without murmurs, rubs or gallops, point of maximum impulse non-displaced  Abdomen: Soft, non-tender or non-distended without rigidity or guarding and positive bowel sounds all four quadrants. Extremities: No clubbing, cyanosis, or edema bilaterally. Full range of motion without deformity and normal gait intact. Skin: Skin color, texture, turgor normal.  No rashes or lesions. Neurologic: Alert and oriented X 3, neurovascularly intact with sensory/motor intact upper extremities/lower extremities, bilaterally. Cranial nerves: II-XII intact, grossly non-focal.  Mental status: Alert, oriented, thought content appropriate. Labs:   Recent Labs     03/23/19 0147 03/24/19 0624   WBC 9.4 8.8   HGB 12.4* 13.1*   HCT 36.5* 38.2*    260     Recent Labs     03/23/19 0147 03/24/19 0624    139   K 3.8 3.5   CL 99 100   CO2 27 29   BUN 14 18   CREATININE 0.9 1.0   CALCIUM 8.6 8.9     Recent Labs     03/23/19 0147 03/24/19 0624   AST 23 20   ALT 25 24   BILITOT 0.5 0.5   ALKPHOS 93 77     No results for input(s): INR in the last 72 hours. No results for input(s): Ellender Mcdonald in the last 72 hours. Urinalysis:      Lab Results   Component Value Date    NITRU Negative 03/23/2019    BLOODU Negative 03/23/2019    SPECGRAV <=1.005 03/23/2019    GLUCOSEU Negative 03/23/2019       Radiology:  CT CHEST W CONTRAST   Final Result   1. Right upper lobe and right lower lobe nodular infiltrates probably   represent pneumonia. 2. Right apical pulmonary nodule. Recommended follow-up: In a low-risk   patient, CT at 6-12 months, then consider CT at 18-24 months. In a high-risk   patient, CT at 6-12 months, then CT at 18-24 months. 3. The smaller patchy opacities bilaterally could represent atelectasis or   pneumonia. Assessment/Plan:    Active Hospital Problems    Diagnosis Date Noted    Multifocal pneumonia [J18.9] 03/23/2019     CAP - organism unspecified. Sputum cult. Cover with zithro rocephin. Improved. Coag negative staph in blood culture- contaminant     Chronic syst CHF - appears compensated. .   Stop IVF. Given lasix IV 3/23.    He appears euvolemic today. Cont coreg, lipitor, dig, lisinopril, ASA.      HTN uncontrolled  Stopped IVF. Cont PTA BP meds.    Increase amlodipine.        DVT Prophylaxis: lovenox  Diet: DIET GENERAL;  Code Status: Full Code        D/c home      BRIDGET Hernandez.

## 2019-03-25 NOTE — CARE COORDINATION
DISCHARGE ORDER  Date/Time 3/25/2019 9:57 AM  Completed by: Jazmín Lopez, Case Management    Patient Name: Kaci Hays    : 1934  Admitting Diagnosis: Multifocal pneumonia [J18.9]  Admit Date/Time: 3/23/2019  1:23 AM    Noted discharge order. Confirmed discharge plan with patient / family (pt): Yes   Discharge Plan: Reviewed chart. Met with pt. Pt declines [facility] at this time. Pt's daughter to pick pt up at 1530 today. Discharge timeout done with ALECIA Cho. All discharge needs and concerns addressed.

## 2019-03-25 NOTE — DISCHARGE SUMMARY
Name:  [facility]  Room:  0209/0209-01  MRN:    [id number]    Discharge Summary      This discharge summary is in conjunction with a complete physical exam done on the day of discharge. Discharging Physician: Dr. Riojas Feeling: 3/23/2019  Discharge:   3/25/2019    HPI taken from admission H&P:    The patient is a 80 y.o. male w hx of chronic systolic CHF EF 85% improved to normal in 2015, HTN, HLD, CAD, who presents w SOB.      Pt reports for about 7-8 days he has been having progressively worsening generalized weakness, associated with cough productive of yellow sputum. He is not sure about fever, but had chills and rigors.      He came to ED about a week ago and was discharged home. He returned because symptoms were worsening - reports he stayed in bed all week.      CT revealed RUL and RLL PNA and pt directed for admission. Diagnoses this Admission and Hospital Course   CAP   - organism unspecifie, suspect gram + organism    - Sputum cult. - Cover with zithro rocephin-->D/C home on Cefdinir and Azithromycin    - Coag negative staph in blood culture- contaminant     Chronic syst CHF   - appears compensated. .   - Given lasix IV 3/23.   - Cont coreg, lipitor, dig, lisinopril, ASA.      HTN   - uncontrolled  - Increase amlodipine. Procedures (Please Review Full Report for Details)  none    Consults    none    Physical Exam at Discharge:    BP (!) 142/70   Pulse 57   Temp 97.2 °F (36.2 °C) (Oral)   Resp 17   Ht 5' 3\" (1.6 m)   Wt 132 lb 8 oz (60.1 kg)   SpO2 96%   BMI 23.47 kg/m²     General appearance: No apparent distress appears stated age and cooperative. HEENT Normal cephalic, atraumatic without obvious deformity.  Pupils equal, round, and reactive to light.  Extra ocular muscles intact.  Conjunctivae/corneas clear. Neck: Supple, No jugular venous distention/bruits.  Trachea midline without thyromegaly or adenopathy with full range of motion.   Lungs: Clear to auscultation, bilaterally without Rales/Wheezes/Rhonchi with good respiratory effort. Heart: Regular rate and rhythm with Normal S1/S2 without murmurs, rubs or gallops, point of maximum impulse non-displaced  Abdomen: Soft, non-tender or non-distended without rigidity or guarding and positive bowel sounds all four quadrants. Extremities: No clubbing, cyanosis, or edema bilaterally.  Full range of motion without deformity and normal gait intact. Skin: Skin color, texture, turgor normal.  No rashes or lesions. Neurologic: Alert and oriented X 3, neurovascularly intact with sensory/motor intact upper extremities/lower extremities, bilaterally.  Cranial nerves: II-XII intact, grossly non-focal.  Mental status: Alert, oriented, thought content appropriate. CBC:   Recent Labs     03/23/19  0147 03/24/19  0624   WBC 9.4 8.8   HGB 12.4* 13.1*   HCT 36.5* 38.2*   MCV 91.5 91.2    260     BMP:   Recent Labs     03/23/19  0147 03/24/19  0624    139   K 3.8 3.5   CL 99 100   CO2 27 29   BUN 14 18   CREATININE 0.9 1.0     LIVER PROFILE:   Recent Labs     03/23/19  0147 03/24/19  0624   AST 23 20   ALT 25 24   BILITOT 0.5 0.5   ALKPHOS 93 77     UA:  Recent Labs     03/23/19  0158   COLORU Yellow   PHUR 7.0   CLARITYU Clear   SPECGRAV <=1.005   LEUKOCYTESUR Negative   UROBILINOGEN 0.2   BILIRUBINUR Negative   BLOODU Negative   GLUCOSEU Negative     Lactic Acid 1.0     L. pneumophila Serogp 1 Ur Ag 03/23/2019  1:03 PM [address] [facility]   Presumptive Negative     STREP PNEUMONIAE ANTIGEN, URINE 03/23/2019  1:05 PM [address] [facility]   Presumptive Negative       CULTURES  Influenza: Negative   Blood Cx #1: Coag-neg Staph   Blood Cx #2: NGTD  MRSA DNA probe: Negative     RADIOLOGY  CT CHEST W CONTRAST   Final Result   1. Right upper lobe and right lower lobe nodular infiltrates probably   represent pneumonia. 2. Right apical pulmonary nodule. Recommended follow-up:  In a low-risk   patient, CT at 6-12 months, then consider CT at 18-24 months. In a high-risk   patient, CT at 6-12 months, then CT at 18-24 months. 3. The smaller patchy opacities bilaterally could represent atelectasis or   pneumonia. Discharge Medications     Medication List      START taking these medications    azithromycin 250 MG tablet  Commonly known as:  ZITHROMAX  Take 1 tablet by mouth daily for 3 days     cefdinir 300 MG capsule  Commonly known as:  OMNICEF  Take 1 capsule by mouth 2 times daily for 6 days        CONTINUE taking these medications    amLODIPine 5 MG tablet  Commonly known as:  NORVASC  Take 1 tablet by mouth daily     aspirin 81 MG chewable tablet  Take 1 tablet by mouth daily. atorvastatin 10 MG tablet  Commonly known as:  LIPITOR  Take 1 tablet by mouth daily     carvedilol 12.5 MG tablet  Commonly known as:  COREG  Take 1 tablet by mouth 2 times daily     digoxin 125 MCG tablet  Commonly known as:  LANOXIN  Take 1 tablet by mouth daily     lisinopril 20 MG tablet  Commonly known as:  PRINIVIL;ZESTRIL  Take 1 tablet by mouth daily     ondansetron 4 MG disintegrating tablet  Commonly known as:  ZOFRAN ODT  Take 1 tablet by mouth every 8 hours as needed for Nausea           Where to Get Your Medications      These medications were sent to [address] [place] [address], [address] [address]    Phone:  [phone]   · azithromycin 250 MG tablet  · cefdinir 300 MG capsule           Discharged in stable condition to home    Follow Up: Follow up with PCP in 1 week      BRIDGET Hernandez.

## 2019-03-25 NOTE — PROGRESS NOTES
and timeliness on cue as per palpable assessment of the areas of the mandible, hyoid bone, and thyroid cartilage. Recommendations:  MBS would be needed at this point to correlate clinical findings and he does not feel that this is needed. MBS would be needed to determine if there is a pharyngeal dysphagia and would treatment would be needed. Patient/Family/Caregiver Education:  Explained scope of practice    Compensatory Strategies:  See CBSE    Plan:    Discontinue Dysphagia treatment and re-consult if MBS is desired     Discharge Recommendations: If pt discharges from hospital prior to Speech/Swallowing discharge, this note serves as tx and discharge summary. Total Treatment Time:   6241-3047  Swallow tx    Texie Para CCC-SLP. D [facility]  3-25-19

## 2019-03-25 NOTE — PROGRESS NOTES
Assessment complete. Aron is alert and oriented. Vital signs are per flowsheet. Respirations are easy and even at rest.  Aron denies any pain or discomfort. Aron seems disagreeable this shift. Refuses medications and complains about doctors and not getting what he asks for. Aron is lying in bed lights out with eyes closed, 2/4 side rails up, bed in lowest position, call light in easy reach, bed alarm off, and he denies any further needs. No other complications are noted, will continue to monitor throughout shift.

## 2019-03-25 NOTE — PROGRESS NOTES
DC instructions reviewed with patient and daughter, both denied any needs before leaving. IV removed with no complications. Patient stated he had all of his belongings.

## 2019-03-26 ENCOUNTER — TELEPHONE (OUTPATIENT)
Dept: INTERNAL MEDICINE CLINIC | Age: 84
End: 2019-03-26

## 2019-03-28 LAB
BLOOD CULTURE, ROUTINE: ABNORMAL
CULTURE, BLOOD 2: NORMAL
ORGANISM: ABNORMAL

## 2019-03-29 ENCOUNTER — OFFICE VISIT (OUTPATIENT)
Dept: INTERNAL MEDICINE CLINIC | Age: 84
End: 2019-03-29

## 2019-03-29 VITALS
HEIGHT: 62 IN | WEIGHT: 138 LBS | BODY MASS INDEX: 25.4 KG/M2 | TEMPERATURE: 97.5 F | DIASTOLIC BLOOD PRESSURE: 60 MMHG | SYSTOLIC BLOOD PRESSURE: 128 MMHG | HEART RATE: 72 BPM | RESPIRATION RATE: 12 BRPM

## 2019-03-29 DIAGNOSIS — J18.9 PNEUMONIA DUE TO ORGANISM: ICD-10-CM

## 2019-03-29 DIAGNOSIS — Z09 HOSPITAL DISCHARGE FOLLOW-UP: Primary | ICD-10-CM

## 2019-03-29 PROCEDURE — 99214 OFFICE O/P EST MOD 30 MIN: CPT | Performed by: PHYSICIAN ASSISTANT

## 2019-03-29 PROCEDURE — 1111F DSCHRG MED/CURRENT MED MERGE: CPT | Performed by: PHYSICIAN ASSISTANT

## 2019-03-29 ASSESSMENT — ENCOUNTER SYMPTOMS
SINUS PRESSURE: 0
DIARRHEA: 0
COUGH: 0
SINUS PAIN: 0
VOMITING: 0
SHORTNESS OF BREATH: 0
CONSTIPATION: 0
ABDOMINAL PAIN: 0
NAUSEA: 0
SORE THROAT: 0

## 2019-05-10 RX ORDER — FUROSEMIDE 20 MG/1
20 TABLET ORAL 2 TIMES DAILY
COMMUNITY
End: 2019-08-05

## 2019-05-15 ENCOUNTER — HOSPITAL ENCOUNTER (OUTPATIENT)
Dept: ENDOSCOPY | Age: 84
Setting detail: OUTPATIENT SURGERY
Discharge: HOME | End: 2019-05-15
Payer: MEDICARE

## 2019-05-15 VITALS
RESPIRATION RATE: 16 BRPM | SYSTOLIC BLOOD PRESSURE: 186 MMHG | HEART RATE: 50 BPM | HEIGHT: 62 IN | BODY MASS INDEX: 23 KG/M2 | WEIGHT: 125 LBS | DIASTOLIC BLOOD PRESSURE: 73 MMHG

## 2019-05-15 PROCEDURE — 6370000000 HC RX 637 (ALT 250 FOR IP): Performed by: OPHTHALMOLOGY

## 2019-05-15 PROCEDURE — 66821 AFTER CATARACT LASER SURGERY: CPT

## 2019-05-15 RX ORDER — SOFT LENS RINSE,STORE SOLUTION
SOLUTION, NON-ORAL MISCELLANEOUS ONCE
Status: COMPLETED | OUTPATIENT
Start: 2019-05-15 | End: 2019-05-15

## 2019-05-15 RX ORDER — TROPICAMIDE 10 MG/ML
1 SOLUTION/ DROPS OPHTHALMIC ONCE
Status: COMPLETED | OUTPATIENT
Start: 2019-05-15 | End: 2019-05-15

## 2019-05-15 RX ORDER — APRACLONIDINE HYDROCHLORIDE 5 MG/ML
1 SOLUTION/ DROPS OPHTHALMIC 2 TIMES DAILY PRN
Status: COMPLETED | OUTPATIENT
Start: 2019-05-15 | End: 2019-05-15

## 2019-05-15 RX ORDER — PHENYLEPHRINE HCL 2.5 %
1 DROPS OPHTHALMIC (EYE) ONCE
Status: COMPLETED | OUTPATIENT
Start: 2019-05-15 | End: 2019-05-15

## 2019-05-15 RX ORDER — PROPARACAINE HYDROCHLORIDE 5 MG/ML
1 SOLUTION/ DROPS OPHTHALMIC ONCE
Status: COMPLETED | OUTPATIENT
Start: 2019-05-15 | End: 2019-05-15

## 2019-05-15 RX ADMIN — APRACLONIDINE 1 DROP: 5.75 SOLUTION OPHTHALMIC at 11:15

## 2019-05-15 RX ADMIN — APRACLONIDINE 1 DROP: 5.75 SOLUTION OPHTHALMIC at 12:28

## 2019-05-15 RX ADMIN — TROPICAMIDE 1 DROP: 10 SOLUTION/ DROPS OPHTHALMIC at 11:05

## 2019-05-15 RX ADMIN — PHENYLEPHRINE HYDROCHLORIDE 1 DROP: 25 SOLUTION/ DROPS OPHTHALMIC at 11:10

## 2019-05-15 RX ADMIN — Medication: at 12:24

## 2019-05-15 RX ADMIN — PROPARACAINE HYDROCHLORIDE 1 DROP: 5 SOLUTION/ DROPS OPHTHALMIC at 12:21

## 2019-05-15 ASSESSMENT — PAIN - FUNCTIONAL ASSESSMENT
PAIN_FUNCTIONAL_ASSESSMENT: 0-10
PAIN_FUNCTIONAL_ASSESSMENT: 0-10

## 2019-05-15 NOTE — PROGRESS NOTES
Pt here for laser eye procedure with Dr. Asim Mcdowell  Procedure explained to pt and consent obtained  Laser eye procedure completed  See Dr. Wenecslao Drew procedural note for details  Pt gloria well  No c/o's voiced   Discharge instructions reviewed with pt and copy given   Understanding verbalized  Pt discharged to home in stable condition

## 2019-05-15 NOTE — OP NOTE
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 Dilation drops were then applied to induce adequate mydriasis. The patient was then taken to the laser room and  positioned in front of the YAG laser. Topical Proparacaine drops were then applied. The patient then received 37 applications of 2.0 millijoules to the densely opacified posterior capsule. Once this was accomplished, a nice capsulotomy was performed. The eye was then rinsed using sterile saline. Iopidine 0.5% was then applied. The patient's pressure will be measured closely and conservatively as an outpatient in my office. Patient tolerated the procedure well without any complications.

## 2019-08-05 ENCOUNTER — OFFICE VISIT (OUTPATIENT)
Dept: CARDIOLOGY CLINIC | Age: 84
End: 2019-08-05
Payer: MEDICARE

## 2019-08-05 VITALS
HEART RATE: 51 BPM | HEIGHT: 62 IN | WEIGHT: 139.4 LBS | OXYGEN SATURATION: 98 % | BODY MASS INDEX: 25.65 KG/M2 | DIASTOLIC BLOOD PRESSURE: 74 MMHG | SYSTOLIC BLOOD PRESSURE: 128 MMHG

## 2019-08-05 DIAGNOSIS — I10 ESSENTIAL HYPERTENSION: ICD-10-CM

## 2019-08-05 DIAGNOSIS — I34.0 MITRAL VALVE INSUFFICIENCY, UNSPECIFIED ETIOLOGY: ICD-10-CM

## 2019-08-05 DIAGNOSIS — I50.22 CHRONIC SYSTOLIC CONGESTIVE HEART FAILURE (HCC): Primary | ICD-10-CM

## 2019-08-05 DIAGNOSIS — I42.9 CARDIOMYOPATHY, UNSPECIFIED TYPE (HCC): ICD-10-CM

## 2019-08-05 DIAGNOSIS — I49.3 PVC (PREMATURE VENTRICULAR CONTRACTION): ICD-10-CM

## 2019-08-05 PROCEDURE — 99214 OFFICE O/P EST MOD 30 MIN: CPT | Performed by: INTERNAL MEDICINE

## 2019-08-05 RX ORDER — DIGOXIN 125 MCG
125 TABLET ORAL DAILY
Qty: 90 TABLET | Refills: 3 | Status: SHIPPED | OUTPATIENT
Start: 2019-08-05 | End: 2020-08-24

## 2019-08-05 RX ORDER — CARVEDILOL 12.5 MG/1
12.5 TABLET ORAL 2 TIMES DAILY
Qty: 180 TABLET | Refills: 3 | Status: SHIPPED | OUTPATIENT
Start: 2019-08-05 | End: 2020-08-24

## 2019-08-05 RX ORDER — LISINOPRIL 20 MG/1
20 TABLET ORAL DAILY
Qty: 90 TABLET | Refills: 3 | Status: SHIPPED | OUTPATIENT
Start: 2019-08-05 | End: 2020-08-24

## 2019-08-05 RX ORDER — ATORVASTATIN CALCIUM 10 MG/1
10 TABLET, FILM COATED ORAL DAILY
Qty: 90 TABLET | Refills: 3 | Status: SHIPPED | OUTPATIENT
Start: 2019-08-05 | End: 2020-08-24

## 2019-08-05 RX ORDER — AMLODIPINE BESYLATE 5 MG/1
5 TABLET ORAL DAILY
Qty: 90 TABLET | Refills: 3 | Status: SHIPPED | OUTPATIENT
Start: 2019-08-05 | End: 2020-08-24

## 2019-08-05 NOTE — LETTER
months. 3. The smaller patchy opacities bilaterally could represent atelectasis or pneumonia. Chest Xray 3/15/19  No acute disease. EKG 1/14/16 SB with non specific ST and T changes in lateral leads     ECHO 7/21/15  Summary  This is a limited study for CHF, cardiomyopathy and mitral regurgitation. Ejection fraction calculated by  is 62 %. LV systolic function is normal with ejection fraction estimated to be 60%. No regional wall motion abnormalities are noted. Mild mitral regurgitation is present. There is mild concentric left ventricular hypertrophy. There is reversal of E/A inflow velocities across the mitral valve  suggesting type I diastolic dysfunction. Mild aortic regurgitation is present. ECHO 1/7/15  Summary  sinus rhythm is present during the test.  The ejection fraction measured by Monge's method is 26 %. Severe global hypokinesis is present. Left ventricular size is at upper limit of normal.  There is mild concentric left ventricular hypertrophy. Doppler study suggests diastolic dysfunction with impaired relaxation and  elevated filling pressure. Interventricular septum is flat consistent   with  volume and pressure overload of right ventricle. Mitral valve is structurally normal.  Mitral valve leaflets appear to open adequately. Moderate mitral regurgitation is present. ERO 0.41 with MR volume of 51ml. The left atrium is severely dilated by volume measurement. The aortic valve is structurally normal.  The aortic leaflets appear to open adequately. The aortic valve appears tricuspid. Mild aortic regurgitation is present. The aortic root is slightly enlarged at 3.8cm. Right ventricular systolic function is normal .  The right ventricle is enlarged. Tricuspid valve is structurally normal.  The tricuspid valve leaflets appear to open adequately. There is moderate tricuspid regurgitation.   Systolic pulmonary artery pressure (SPAP) is estimated at 51 mmHg

## 2019-08-09 ENCOUNTER — HOSPITAL ENCOUNTER (OUTPATIENT)
Age: 84
Discharge: HOME | End: 2019-08-09
Payer: MEDICARE

## 2019-08-09 DIAGNOSIS — I10 ESSENTIAL HYPERTENSION: ICD-10-CM

## 2019-08-09 DIAGNOSIS — I50.22 CHRONIC SYSTOLIC CONGESTIVE HEART FAILURE (HCC): ICD-10-CM

## 2019-08-09 DIAGNOSIS — I42.9 CARDIOMYOPATHY, UNSPECIFIED TYPE (HCC): ICD-10-CM

## 2019-08-09 DIAGNOSIS — I34.0 MITRAL VALVE INSUFFICIENCY, UNSPECIFIED ETIOLOGY: ICD-10-CM

## 2019-08-09 LAB
CHOLESTEROL, TOTAL: 125 MG/DL (ref 0–199)
DIGOXIN LEVEL: 0.9 NG/ML (ref 0.8–2)
HDLC SERPL-MCNC: 33 MG/DL (ref 40–60)
LDL CHOLESTEROL CALCULATED: 61 MG/DL
TRIGL SERPL-MCNC: 154 MG/DL (ref 0–150)
VLDLC SERPL CALC-MCNC: 31 MG/DL

## 2019-08-09 PROCEDURE — 36415 COLL VENOUS BLD VENIPUNCTURE: CPT

## 2019-08-09 PROCEDURE — 80061 LIPID PANEL: CPT

## 2019-08-09 PROCEDURE — 80162 ASSAY OF DIGOXIN TOTAL: CPT

## 2019-08-12 ENCOUNTER — TELEPHONE (OUTPATIENT)
Dept: CARDIOLOGY CLINIC | Age: 84
End: 2019-08-12

## 2019-08-12 NOTE — TELEPHONE ENCOUNTER
----- Message from Hood Felder MD sent at 8/9/2019  2:34 PM EDT -----  Blood test looks good. Call patient please.

## 2020-03-09 NOTE — PROGRESS NOTES
[address] Office Note  3/10/2020     Subjective:  Mr. Adam Torres is here for cardiac follow up sCHF, HTN,CMP, MR      [place]:  Today he reports feeling well over all. Has been upset over family matters. Denies chest pain, shortness of breath, edema, dizziness, palpitations and syncope. [facility]  Merary Mooney is a 80 y.o. patient with [facility] HTN which has been untreated for 20 years. He reports no medical care for many years. He was admitted to [facility] 1/7/15    His son took his Blood pressure which was noted to be high systolic 366. Patients SOB progressively worsened over week until he had to sleep in recliner for rest.  In the ER his BP was 194/120, EKG revealed Sinus tachycardiaPossible Left atrial enlargementST & T wave abnormality, consider lateral ischemia, Troponin was negative, BNP was 31927. Echo this admission shows EF 26%, Severe global hypokinesis, Moderate mitral regurgitation, moderate tricuspid regurgitation, moderate pulmonary hypertension. Repeat Limited Echo on 2015 with EF of 62% and improved MR from moderate to mild. He was in hospital for 2 days in 2019 with pneumonia. Review of Systems:  12 point ROS negative in all areas as listed below except as in [place]  Constitutional, EENT, Cardiovascular, pulmonary, GI, , Musculoskeletal, skin, neurological, hematological, endocrine, Psychiatric    Reviewed past medical history, social, and family history.     Both parents  suddenly  Nonsmoker no alcohol former smoker 2 ppd x 30 yrs quit 36 yrs ago  Dad no heart dz Mom no heart dz  Past Medical History:   Diagnosis Date    Chronic systolic CHF (congestive heart failure) (HCC)     Heart disease     Hyperlipidemia     Hypertension      Past Surgical History:   Procedure Laterality Date    CATARACT REMOVAL WITH IMPLANT Left 3/16/16    EYE SURGERY Right 16    Cataract removal with implant Dr. Marleny Edwards         Objective:   /70   Pulse 58   Ht 5' 2\" CA  LIVER PROFILE: No results for input(s): AST, ALT, LIPASE, BILIDIR, BILITOT, ALKPHOS in the last 72 hours. Invalid input(s): AMYLASE,  ALB  LIPID:   No components found for: CHLPL  Lab Results   Component Value Date    TRIG 154 (H) 08/09/2019    TRIG 187 (H) 06/26/2018    TRIG 139 01/14/2017     Lab Results   Component Value Date    HDL 33 (L) 08/09/2019    HDL 41 06/26/2018    HDL 33 (L) 01/14/2017     Lab Results   Component Value Date    LDLCALC 61 08/09/2019    LDLCALC 162 (H) 06/26/2018    LDLCALC 139 (H) 01/14/2017     Lab Results   Component Value Date    LABVLDL 31 08/09/2019    LABVLDL 37 06/26/2018    LABVLDL 28 01/14/2017     PT/INR: No results for input(s): PROTIME, INR in the last 72 hours. A1C:   Lab Results   Component Value Date    LABA1C 5.2 01/08/2015     BNP:  No results for input(s): BNP in the last 72 hours. IMAGING:   CT chest 3/23/19   1. Right upper lobe and right lower lobe nodular infiltrates probably represent pneumonia. 2. Right apical pulmonary nodule. Recommended follow-up: In a low-risk patient, CT at 6-12 months, then consider CT at 18-24 months. In a high-risk patient, CT at 6-12 months, then CT at 18-24 months. 3. The smaller patchy opacities bilaterally could represent atelectasis or pneumonia. Chest Xray 3/15/19  No acute disease. EKG 1/14/16 SB with non specific ST and T changes in lateral leads     ECHO 7/21/15  Summary  This is a limited study for CHF, cardiomyopathy and mitral regurgitation. Ejection fraction calculated by  is 62 %. LV systolic function is normal with ejection fraction estimated to be 60%. No regional wall motion abnormalities are noted. Mild mitral regurgitation is present. There is mild concentric left ventricular hypertrophy. There is reversal of E/A inflow velocities across the mitral valve  suggesting type I diastolic dysfunction. Mild aortic regurgitation is present.     ECHO 1/7/15  Summary  sinus rhythm is present during the

## 2020-03-10 ENCOUNTER — HOSPITAL ENCOUNTER (OUTPATIENT)
Age: 85
Discharge: HOME | End: 2020-03-10
Payer: MEDICARE

## 2020-03-10 ENCOUNTER — OFFICE VISIT (OUTPATIENT)
Dept: CARDIOLOGY CLINIC | Age: 85
End: 2020-03-10
Payer: MEDICARE

## 2020-03-10 VITALS
DIASTOLIC BLOOD PRESSURE: 70 MMHG | OXYGEN SATURATION: 99 % | SYSTOLIC BLOOD PRESSURE: 120 MMHG | WEIGHT: 137.8 LBS | HEIGHT: 62 IN | HEART RATE: 58 BPM | BODY MASS INDEX: 25.36 KG/M2

## 2020-03-10 LAB
A/G RATIO: 1 (ref 1.1–2.2)
ALBUMIN SERPL-MCNC: 4 G/DL (ref 3.4–5)
ALP BLD-CCNC: 107 U/L (ref 40–129)
ALT SERPL-CCNC: 31 U/L (ref 10–40)
ANION GAP SERPL CALCULATED.3IONS-SCNC: 17 MMOL/L (ref 3–16)
AST SERPL-CCNC: 33 U/L (ref 15–37)
BASOPHILS ABSOLUTE: 0.1 K/UL (ref 0–0.2)
BASOPHILS RELATIVE PERCENT: 0.8 %
BILIRUB SERPL-MCNC: 0.9 MG/DL (ref 0–1)
BUN BLDV-MCNC: 23 MG/DL (ref 7–20)
CALCIUM SERPL-MCNC: 9.3 MG/DL (ref 8.3–10.6)
CHLORIDE BLD-SCNC: 99 MMOL/L (ref 99–110)
CO2: 21 MMOL/L (ref 21–32)
CREAT SERPL-MCNC: 1 MG/DL (ref 0.8–1.3)
EOSINOPHILS ABSOLUTE: 0.2 K/UL (ref 0–0.6)
EOSINOPHILS RELATIVE PERCENT: 2.5 %
GFR AFRICAN AMERICAN: >60
GFR NON-AFRICAN AMERICAN: >60
GLOBULIN: 3.9 G/DL
GLUCOSE BLD-MCNC: 104 MG/DL (ref 70–99)
HCT VFR BLD CALC: 44.5 % (ref 40.5–52.5)
HEMOGLOBIN: 15.2 G/DL (ref 13.5–17.5)
LYMPHOCYTES ABSOLUTE: 1.7 K/UL (ref 1–5.1)
LYMPHOCYTES RELATIVE PERCENT: 21.2 %
MCH RBC QN AUTO: 31.4 PG (ref 26–34)
MCHC RBC AUTO-ENTMCNC: 34.1 G/DL (ref 31–36)
MCV RBC AUTO: 92 FL (ref 80–100)
MONOCYTES ABSOLUTE: 0.6 K/UL (ref 0–1.3)
MONOCYTES RELATIVE PERCENT: 7.5 %
NEUTROPHILS ABSOLUTE: 5.4 K/UL (ref 1.7–7.7)
NEUTROPHILS RELATIVE PERCENT: 68 %
PDW BLD-RTO: 14.1 % (ref 12.4–15.4)
PLATELET # BLD: 191 K/UL (ref 135–450)
PMV BLD AUTO: 9.1 FL (ref 5–10.5)
POTASSIUM SERPL-SCNC: 4.1 MMOL/L (ref 3.5–5.1)
RBC # BLD: 4.84 M/UL (ref 4.2–5.9)
SODIUM BLD-SCNC: 137 MMOL/L (ref 136–145)
TOTAL PROTEIN: 7.9 G/DL (ref 6.4–8.2)
WBC # BLD: 7.9 K/UL (ref 4–11)

## 2020-03-10 PROCEDURE — 85025 COMPLETE CBC W/AUTO DIFF WBC: CPT

## 2020-03-10 PROCEDURE — 80053 COMPREHEN METABOLIC PANEL: CPT

## 2020-03-10 PROCEDURE — 36415 COLL VENOUS BLD VENIPUNCTURE: CPT

## 2020-03-10 PROCEDURE — 99214 OFFICE O/P EST MOD 30 MIN: CPT | Performed by: INTERNAL MEDICINE

## 2020-03-10 NOTE — LETTER
[id number]  [address] [address]  Phone: [phone]  Fax: [phone]     Makayla Mcgarry MD     3/18/2020     Edilberto Hartley MD   [address] [facility]  [address] [facility] [address]     Patient: Audi Patino   MR Number: <[id number]>   YOB: 1934   Date of Visit: 3/10/2020     Dear Dr. Edilberto Hartley     Today I saw our mutual patient named above. Below are the relevant portions of my assessment and plan of care. If you have questions, please do not hesitate to call me. I look forward to following Mike Christensen along with you. [facility] Office Note  3/10/2020     Subjective:  Mr. Gilford Spore is here for cardiac follow up sCHF, HTN,CMP, MR      [place]:  Today he reports feeling well over all. Has been upset over family matters. Denies chest pain, shortness of breath, edema, dizziness, palpitations and syncope. [facility]  Audi Patino is a 80 y.o. patient with PMH HTN which has been untreated for 20 years. He reports no medical care for many years. He was admitted to [facility] 1/7/15    His son took his Blood pressure which was noted to be high systolic 505. Patients SOB progressively worsened over week until he had to sleep in recliner for rest.  In the ER his BP was 194/120, EKG revealed Sinus tachycardiaPossible Left atrial enlargementST & T wave abnormality, consider lateral ischemia, Troponin was negative, BNP was 57312. Echo this admission shows EF 26%, Severe global hypokinesis, Moderate mitral regurgitation, moderate tricuspid regurgitation, moderate pulmonary hypertension. Repeat Limited Echo on 07/21/2015 with EF of 62% and improved MR from moderate to mild. He was in hospital for 2 days in march 2019 with pneumonia.         Review of Systems:  12 point ROS negative in all areas as listed below except as in [place]  Constitutional, EENT, Cardiovascular, pulmonary, GI, , Musculoskeletal, skin, neurological, hematological, endocrine, Psychiatric documentation, as scribed by the above signed scribe in my presence. It is both accurate and complete to my knowledge. I agree with the details independently gathered by the clinical support staff, while the remaining scribed note accurately describes my personal service to the patient.       Leena Broussard MD 3/10/2020 1:13 PM             Sincerely,      Leena Broussard MD

## 2020-03-10 NOTE — PATIENT INSTRUCTIONS
Plan:  1. Meds reviewed no refills warranted  2. No need for cardiac testing at this time. 3. Stable and will continue present medical regimen. 4. Will check cmp, cbc today  5.  Follow up with me 7 months

## 2020-03-11 ENCOUNTER — TELEPHONE (OUTPATIENT)
Dept: CARDIOLOGY CLINIC | Age: 85
End: 2020-03-11

## 2020-03-11 NOTE — TELEPHONE ENCOUNTER
Created telephone encounter. Spoke with Brady France, who is on HIPAA form  relayed message per [facility] regarding labs. Pt son verbalized understanding.

## 2020-08-24 RX ORDER — LISINOPRIL 20 MG/1
20 TABLET ORAL DAILY
Qty: 90 TABLET | Refills: 0 | Status: SHIPPED | OUTPATIENT
Start: 2020-08-24 | End: 2020-10-12 | Stop reason: SDUPTHER

## 2020-08-24 RX ORDER — ATORVASTATIN CALCIUM 10 MG/1
10 TABLET, FILM COATED ORAL DAILY
Qty: 90 TABLET | Refills: 0 | Status: SHIPPED | OUTPATIENT
Start: 2020-08-24 | End: 2020-10-12 | Stop reason: SDUPTHER

## 2020-08-24 RX ORDER — CARVEDILOL 12.5 MG/1
12.5 TABLET ORAL 2 TIMES DAILY
Qty: 180 TABLET | Refills: 0 | Status: SHIPPED | OUTPATIENT
Start: 2020-08-24 | End: 2020-10-12 | Stop reason: SDUPTHER

## 2020-08-24 RX ORDER — AMLODIPINE BESYLATE 5 MG/1
5 TABLET ORAL DAILY
Qty: 90 TABLET | Refills: 0 | Status: SHIPPED | OUTPATIENT
Start: 2020-08-24 | End: 2020-10-12 | Stop reason: SDUPTHER

## 2020-08-24 RX ORDER — DIGOXIN 125 MCG
125 TABLET ORAL DAILY
Qty: 90 TABLET | Refills: 0 | Status: SHIPPED | OUTPATIENT
Start: 2020-08-24 | End: 2020-10-12 | Stop reason: SDUPTHER

## 2020-10-09 NOTE — PROGRESS NOTES
[facility] Office Note  10/12/2020     Subjective:  Mr. Vu Duncan is here for cardiac follow up s-CHF, HTN,CMP, MR, PVC      [place]:   Today she reports doing well. He has been trimming limbs on trees in his yard. Denies chest pain, shortness of breath, edema, dizziness, palpitations and syncope. [facility]  Shlomo Araiza is a 80 y.o. patient with [facility] HTN which has been untreated for 20 years. He reports no medical care for many years. He was admitted to [facility] 1/7/15    His son took his Blood pressure which was noted to be high systolic 473. Patients SOB progressively worsened over week until he had to sleep in recliner for rest.  In the ER his BP was 194/120, EKG revealed Sinus tachycardiaPossible Left atrial enlargementST & T wave abnormality, consider lateral ischemia, Troponin was negative, BNP was 12098. Echo this admission shows EF 26%, Severe global hypokinesis, Moderate mitral regurgitation, moderate tricuspid regurgitation, moderate pulmonary hypertension. Repeat Limited Echo on 2015 with EF of 62% and improved MR from moderate to mild. He was in hospital for 2 days in 2019 with pneumonia. Review of Systems:  12 point ROS negative in all areas as listed below except as in [place]  Constitutional, EENT, Cardiovascular, pulmonary, GI, , Musculoskeletal, skin, neurological, hematological, endocrine, Psychiatric    Reviewed past medical history, social, and family history.     Both parents  suddenly  Nonsmoker no alcohol former smoker 2 ppd x 30 yrs quit 36 yrs ago  Dad no heart dz Mom no heart dz  Past Medical History:   Diagnosis Date    Chronic systolic CHF (congestive heart failure) (HCC)     Heart disease     Hyperlipidemia     Hypertension      Past Surgical History:   Procedure Laterality Date    CATARACT REMOVAL WITH IMPLANT Left 3/16/16    EYE SURGERY Right 16    Cataract removal with implant Dr. Margarito Patten         Objective:   /60   Pulse 59 CA  LIVER PROFILE: No results for input(s): AST, ALT, LIPASE, BILIDIR, BILITOT, ALKPHOS in the last 72 hours. Invalid input(s): AMYLASE,  ALB  LIPID:   No components found for: CHLPL  Lab Results   Component Value Date    TRIG 154 (H) 08/09/2019    TRIG 187 (H) 06/26/2018    TRIG 139 01/14/2017     Lab Results   Component Value Date    HDL 33 (L) 08/09/2019    HDL 41 06/26/2018    HDL 33 (L) 01/14/2017     Lab Results   Component Value Date    LDLCALC 61 08/09/2019    LDLCALC 162 (H) 06/26/2018    LDLCALC 139 (H) 01/14/2017     Lab Results   Component Value Date    LABVLDL 31 08/09/2019    LABVLDL 37 06/26/2018    LABVLDL 28 01/14/2017     PT/INR: No results for input(s): PROTIME, INR in the last 72 hours. A1C:   Lab Results   Component Value Date    LABA1C 5.2 01/08/2015     BNP:  No results for input(s): BNP in the last 72 hours. IMAGING:   CT chest 3/23/19   1. Right upper lobe and right lower lobe nodular infiltrates probably represent pneumonia. 2. Right apical pulmonary nodule. Recommended follow-up: In a low-risk patient, CT at 6-12 months, then consider CT at 18-24 months. In a high-risk patient, CT at 6-12 months, then CT at 18-24 months. 3. The smaller patchy opacities bilaterally could represent atelectasis or pneumonia. Chest Xray 3/15/19  No acute disease. EKG 1/14/16 SB with non specific ST and T changes in lateral leads     ECHO 7/21/15  Summary  This is a limited study for CHF, cardiomyopathy and mitral regurgitation. Ejection fraction calculated by  is 62 %. LV systolic function is normal with ejection fraction estimated to be 60%. No regional wall motion abnormalities are noted. Mild mitral regurgitation is present. There is mild concentric left ventricular hypertrophy. There is reversal of E/A inflow velocities across the mitral valve  suggesting type I diastolic dysfunction. Mild aortic regurgitation is present.     ECHO 1/7/15  Summary  sinus rhythm is present during the test.  The ejection fraction measured by Monge's method is 26 %. Severe global hypokinesis is present. Left ventricular size is at upper limit of normal.  There is mild concentric left ventricular hypertrophy. Doppler study suggests diastolic dysfunction with impaired relaxation and  elevated filling pressure. Interventricular septum is flat consistent   with  volume and pressure overload of right ventricle. Mitral valve is structurally normal.  Mitral valve leaflets appear to open adequately. Moderate mitral regurgitation is present. ERO 0.41 with MR volume of 51ml. The left atrium is severely dilated by volume measurement. The aortic valve is structurally normal.  The aortic leaflets appear to open adequately. The aortic valve appears tricuspid. Mild aortic regurgitation is present. The aortic root is slightly enlarged at 3.8cm. Right ventricular systolic function is normal .  The right ventricle is enlarged. Tricuspid valve is structurally normal.  The tricuspid valve leaflets appear to open adequately. There is moderate tricuspid regurgitation. Systolic pulmonary artery pressure (SPAP) is estimated at 51 mmHg consistentwith moderate pulmonary hypertension (RA pressure 15 mmHg). The right atrium is severely dilated. IVC size is dilated (>2.1 cm) and collapses < 50% with respiration consistent with markedly elevated RA pressure (15 mmHg). No obvious masses, thrombi, or vegetations are noted.   Lab Results   Component Value Date    CHOL 125 08/09/2019    CHOL 240 (H) 06/26/2018    CHOL 200 (H) 01/14/2017     Lab Results   Component Value Date    TRIG 154 (H) 08/09/2019    TRIG 187 (H) 06/26/2018    TRIG 139 01/14/2017     Lab Results   Component Value Date    HDL 33 (L) 08/09/2019    HDL 41 06/26/2018    HDL 33 (L) 01/14/2017     Lab Results   Component Value Date    LDLCALC 61 08/09/2019    LDLCALC 162 (H) 06/26/2018    LDLCALC 139 (H) 01/14/2017     Lab Results   Component Value Date    LABVLDL

## 2020-10-12 ENCOUNTER — OFFICE VISIT (OUTPATIENT)
Dept: CARDIOLOGY CLINIC | Age: 85
End: 2020-10-12
Payer: MEDICARE

## 2020-10-12 ENCOUNTER — HOSPITAL ENCOUNTER (OUTPATIENT)
Age: 85
Discharge: HOME | End: 2020-10-12
Payer: MEDICARE

## 2020-10-12 VITALS
HEART RATE: 59 BPM | WEIGHT: 138 LBS | SYSTOLIC BLOOD PRESSURE: 120 MMHG | DIASTOLIC BLOOD PRESSURE: 60 MMHG | BODY MASS INDEX: 24.45 KG/M2 | HEIGHT: 63 IN | OXYGEN SATURATION: 98 % | TEMPERATURE: 97.7 F

## 2020-10-12 LAB
A/G RATIO: 1.2 (ref 1.1–2.2)
ALBUMIN SERPL-MCNC: 4.2 G/DL (ref 3.4–5)
ALP BLD-CCNC: 112 U/L (ref 40–129)
ALT SERPL-CCNC: 20 U/L (ref 10–40)
ANION GAP SERPL CALCULATED.3IONS-SCNC: 14 MMOL/L (ref 3–16)
AST SERPL-CCNC: 22 U/L (ref 15–37)
BILIRUB SERPL-MCNC: 0.8 MG/DL (ref 0–1)
BUN BLDV-MCNC: 18 MG/DL (ref 7–20)
CALCIUM SERPL-MCNC: 9.4 MG/DL (ref 8.3–10.6)
CHLORIDE BLD-SCNC: 102 MMOL/L (ref 99–110)
CHOLESTEROL, FASTING: 134 MG/DL (ref 0–199)
CO2: 24 MMOL/L (ref 21–32)
CREAT SERPL-MCNC: 1.1 MG/DL (ref 0.8–1.3)
DIGOXIN LEVEL: 0.8 NG/ML (ref 0.8–2)
GFR AFRICAN AMERICAN: >60
GFR NON-AFRICAN AMERICAN: >60
GLOBULIN: 3.6 G/DL
GLUCOSE BLD-MCNC: 101 MG/DL (ref 70–99)
HDLC SERPL-MCNC: 41 MG/DL (ref 40–60)
LDL CHOLESTEROL CALCULATED: 62 MG/DL
POTASSIUM SERPL-SCNC: 4.4 MMOL/L (ref 3.5–5.1)
SODIUM BLD-SCNC: 140 MMOL/L (ref 136–145)
TOTAL PROTEIN: 7.8 G/DL (ref 6.4–8.2)
TRIGLYCERIDE, FASTING: 153 MG/DL (ref 0–150)
VLDLC SERPL CALC-MCNC: 31 MG/DL

## 2020-10-12 PROCEDURE — 36415 COLL VENOUS BLD VENIPUNCTURE: CPT

## 2020-10-12 PROCEDURE — 80053 COMPREHEN METABOLIC PANEL: CPT

## 2020-10-12 PROCEDURE — 80061 LIPID PANEL: CPT

## 2020-10-12 PROCEDURE — 99214 OFFICE O/P EST MOD 30 MIN: CPT | Performed by: INTERNAL MEDICINE

## 2020-10-12 PROCEDURE — 80162 ASSAY OF DIGOXIN TOTAL: CPT

## 2020-10-12 RX ORDER — LISINOPRIL 20 MG/1
20 TABLET ORAL DAILY
Qty: 90 TABLET | Refills: 3 | Status: ON HOLD | OUTPATIENT
Start: 2020-10-12 | End: 2021-08-20 | Stop reason: HOSPADM

## 2020-10-12 RX ORDER — AMLODIPINE BESYLATE 5 MG/1
5 TABLET ORAL DAILY
Qty: 90 TABLET | Refills: 3 | Status: SHIPPED | OUTPATIENT
Start: 2020-10-12 | End: 2021-11-11

## 2020-10-12 RX ORDER — DIGOXIN 125 MCG
125 TABLET ORAL DAILY
Qty: 90 TABLET | Refills: 3 | Status: SHIPPED | OUTPATIENT
Start: 2020-10-12 | End: 2021-11-11

## 2020-10-12 RX ORDER — CARVEDILOL 12.5 MG/1
12.5 TABLET ORAL 2 TIMES DAILY
Qty: 180 TABLET | Refills: 3 | Status: SHIPPED | OUTPATIENT
Start: 2020-10-12 | End: 2021-11-11

## 2020-10-12 RX ORDER — ATORVASTATIN CALCIUM 10 MG/1
10 TABLET, FILM COATED ORAL DAILY
Qty: 90 TABLET | Refills: 3 | Status: SHIPPED | OUTPATIENT
Start: 2020-10-12 | End: 2021-11-11

## 2020-10-12 NOTE — PATIENT INSTRUCTIONS
Plan:  1. Meds reviewed. Refills as warranted  2. Will check CMP, fasting lipids, dig level. Today he is fasting   3. Continue medications   4.  Follow up with me in 6 months

## 2020-10-13 ENCOUNTER — TELEPHONE (OUTPATIENT)
Dept: CARDIOLOGY CLINIC | Age: 85
End: 2020-10-13

## 2020-10-13 NOTE — TELEPHONE ENCOUNTER
----- Message from Kitty Toledo MD sent at 10/13/2020  8:22 AM EDT -----  Blood test looks good please call patient.   Per hippa form Patient notified of test results, message given on AM, left phone number to call should there be any questions Clarita Mao RN

## 2021-04-27 NOTE — PROGRESS NOTES
[facility] Office Note  2021     Subjective:  Mr. Luis M Rodrigues is here for cardiac follow up s-CHF, HTN,CMP due to hypertensive heart disease, MR, PVC      [place]:   Today he reports doing well no new complaints, other than a lot of bruising on his arms from aspirin. Denies chest pain, shortness of breath, edema, dizziness, palpitations and syncope. He received both covid vaccines    PMH  Debra Suarez is a 80 y.o. patient with PMH HTN which has been untreated for 20 years. He reports no medical care for many years. [facility]    His son took his Blood pressure which was noted to be high systolic 777. Patients SOB progressively worsened over week until he had to sleep in recliner for rest.  In the ER his BP was 194/120, EKG revealed Sinus tachycardiaPossible Left atrial enlargementST & T wave abnormality, consider lateral ischemia, Troponin was negative, BNP was 27184. Echo this admission shows EF 26%, Severe global hypokinesis, Moderate mitral regurgitation, moderate tricuspid regurgitation, moderate pulmonary hypertension. Repeat Limited Echo on 2015 with EF of 62% and improved MR from moderate to mild. He was in hospital for 2 days in 2019 with pneumonia. Review of Systems:  12 point ROS negative in all areas as listed below except as in [place]  Constitutional, EENT,  pulmonary, GI, , Musculoskeletal, neurological, hematological, endocrine, Psychiatric    Reviewed past medical history, social, and family history.     Both parents  suddenly  Nonsmoker no alcohol former smoker 2 ppd x 30 yrs quit 36 yrs ago  Dad no heart dz Mom no heart dz  Past Medical History:   Diagnosis Date    Chronic systolic CHF (congestive heart failure) (HCC)     Heart disease     Hyperlipidemia     Hypertension      Past Surgical History:   Procedure Laterality Date    CATARACT REMOVAL WITH IMPLANT Left 3/16/16    EYE SURGERY Right 16    Cataract removal with implant across the mitral valve  suggesting type I diastolic dysfunction. Mild aortic regurgitation is present. ECHO 1/7/15  Summary  sinus rhythm is present during the test.  The ejection fraction measured by Monge's method is 26 %. Severe global hypokinesis is present. Left ventricular size is at upper limit of normal.  There is mild concentric left ventricular hypertrophy. Doppler study suggests diastolic dysfunction with impaired relaxation and  elevated filling pressure. Interventricular septum is flat consistent   with  volume and pressure overload of right ventricle. Mitral valve is structurally normal.  Mitral valve leaflets appear to open adequately. Moderate mitral regurgitation is present. ERO 0.41 with MR volume of 51ml. The left atrium is severely dilated by volume measurement. The aortic valve is structurally normal.  The aortic leaflets appear to open adequately. The aortic valve appears tricuspid. Mild aortic regurgitation is present. The aortic root is slightly enlarged at 3.8cm. Right ventricular systolic function is normal .  The right ventricle is enlarged. Tricuspid valve is structurally normal.  The tricuspid valve leaflets appear to open adequately. There is moderate tricuspid regurgitation. Systolic pulmonary artery pressure (SPAP) is estimated at 51 mmHg consistentwith moderate pulmonary hypertension (RA pressure 15 mmHg). The right atrium is severely dilated. IVC size is dilated (>2.1 cm) and collapses < 50% with respiration consistent with markedly elevated RA pressure (15 mmHg). No obvious masses, thrombi, or vegetations are noted.     Lab Results   Component Value Date    CHOL 125 08/09/2019    CHOL 240 (H) 06/26/2018    CHOL 200 (H) 01/14/2017     Lab Results   Component Value Date    TRIG 154 (H) 08/09/2019    TRIG 187 (H) 06/26/2018    TRIG 139 01/14/2017     Lab Results   Component Value Date    HDL 41 10/12/2020    HDL 33 (L) 08/09/2019    HDL 41 06/26/2018     Lab Results   Component Value Date    LDLCALC 62 10/12/2020    LDLCALC 61 08/09/2019    LDLCALC 162 (H) 06/26/2018     Lab Results   Component Value Date    LABVLDL 31 10/12/2020    LABVLDL 31 08/09/2019    LABVLDL 37 06/26/2018     No results found for: CHOLHDLRATIO    Assessment:  Cardiomyopathy (HCC)EF normalized, no clinical signs of CHF. He is very active outside home. He has PVC's normal  Dig level. Hypertensive heart disease     Mitral valve insufficiency due to cardiomyopathy. Chronic systolic CHF (congestive heart failure) (HCC) resolved    Essential hypertension,  BP normal      Last zbglpm24/12/20 I personally reviewed labs results in epic and discussed with patient  Excessive bruisinng from little trauma. No CAD or cerebrovascular disease. Plan:  1. Meds reviewed. No Refills  warranted  2. Cardiac risk stratification education was reviewed including diet, exercise/activity, medication  3. Cut aspirin 81mg back to every other day Continue other medications   4. Follow up with me in 7  months    5. check cmp, cbc, lipids in October      QUALITY MEASURES  1. Tobacco Cessation Counseling: NA  2. Retake of BP if >140/90:  NA  3. Documentation to PCP/referring for new patient:  Sent to PCP at close of office visit  4.   patient on anti-platelet: Yes  5.  patient on STATIN therapy:  yes  6. Patient with CHF and aFib on anticoagulation:  NA     This note was scribed in the presence of  Lloyd Diaz MD by Verdis Essex, RN  I, Dr. Lloyd Diaz, personally performed the services described in this documentation, as scribed by the above signed scribe in my presence. It is both accurate and complete to my knowledge. I agree with the details independently gathered by the clinical support staff, while the remaining scribed note accurately describes my personal service to the patient.       [address] [place], MD 4/28/2021 1:23 PM

## 2021-04-28 ENCOUNTER — OFFICE VISIT (OUTPATIENT)
Dept: CARDIOLOGY CLINIC | Age: 86
End: 2021-04-28
Payer: MEDICARE

## 2021-04-28 VITALS
BODY MASS INDEX: 26.83 KG/M2 | OXYGEN SATURATION: 97 % | WEIGHT: 145.8 LBS | SYSTOLIC BLOOD PRESSURE: 124 MMHG | HEIGHT: 62 IN | DIASTOLIC BLOOD PRESSURE: 60 MMHG | HEART RATE: 50 BPM | TEMPERATURE: 97.2 F

## 2021-04-28 DIAGNOSIS — I50.21 HYPERTENSIVE HEART DISEASE WITH ACUTE SYSTOLIC CONGESTIVE HEART FAILURE (HCC): ICD-10-CM

## 2021-04-28 DIAGNOSIS — I34.0 NONRHEUMATIC MITRAL VALVE REGURGITATION: ICD-10-CM

## 2021-04-28 DIAGNOSIS — I11.0 HYPERTENSIVE HEART DISEASE WITH ACUTE SYSTOLIC CONGESTIVE HEART FAILURE (HCC): ICD-10-CM

## 2021-04-28 DIAGNOSIS — I10 ESSENTIAL HYPERTENSION: ICD-10-CM

## 2021-04-28 DIAGNOSIS — E78.2 MIXED HYPERLIPIDEMIA: ICD-10-CM

## 2021-04-28 DIAGNOSIS — I50.22 CHRONIC SYSTOLIC CONGESTIVE HEART FAILURE (HCC): Primary | ICD-10-CM

## 2021-04-28 PROCEDURE — 99214 OFFICE O/P EST MOD 30 MIN: CPT | Performed by: INTERNAL MEDICINE

## 2021-04-28 NOTE — PATIENT INSTRUCTIONS
Plan:  1. Meds reviewed. No Refills  warranted  2. Cardiac risk stratification education was reviewed including diet, exercise/activity, medication  3. Cut aspirin back to every other day Continue medications   4.  Follow up with me in 7  months    5. check cmp, cbc, lipids in October

## 2021-06-21 ENCOUNTER — APPOINTMENT (OUTPATIENT)
Dept: GENERAL RADIOLOGY | Age: 86
End: 2021-06-21
Payer: MEDICARE

## 2021-06-21 ENCOUNTER — HOSPITAL ENCOUNTER (EMERGENCY)
Age: 86
Discharge: HOME | End: 2021-06-22
Attending: STUDENT IN AN ORGANIZED HEALTH CARE EDUCATION/TRAINING PROGRAM
Payer: MEDICARE

## 2021-06-21 DIAGNOSIS — J06.9 VIRAL URI WITH COUGH: Primary | ICD-10-CM

## 2021-06-21 LAB
A/G RATIO: 1.1 (ref 1.1–2.2)
ALBUMIN SERPL-MCNC: 4.3 G/DL (ref 3.4–5)
ALP BLD-CCNC: 87 U/L (ref 40–129)
ALT SERPL-CCNC: 16 U/L (ref 10–40)
ANION GAP SERPL CALCULATED.3IONS-SCNC: 11 MMOL/L (ref 3–16)
AST SERPL-CCNC: 25 U/L (ref 15–37)
BASOPHILS ABSOLUTE: 0.1 K/UL (ref 0–0.2)
BASOPHILS RELATIVE PERCENT: 1.9 %
BILIRUB SERPL-MCNC: 1.2 MG/DL (ref 0–1)
BUN BLDV-MCNC: 24 MG/DL (ref 7–20)
CALCIUM SERPL-MCNC: 9.5 MG/DL (ref 8.3–10.6)
CHLORIDE BLD-SCNC: 102 MMOL/L (ref 99–110)
CO2: 24 MMOL/L (ref 21–32)
CREAT SERPL-MCNC: 1.1 MG/DL (ref 0.8–1.3)
EOSINOPHILS ABSOLUTE: 0.4 K/UL (ref 0–0.6)
EOSINOPHILS RELATIVE PERCENT: 4.4 %
GFR AFRICAN AMERICAN: >60
GFR NON-AFRICAN AMERICAN: >60
GLOBULIN: 4 G/DL
GLUCOSE BLD-MCNC: 90 MG/DL (ref 70–99)
HCT VFR BLD CALC: 42.6 % (ref 40.5–52.5)
HEMOGLOBIN: 14.4 G/DL (ref 13.5–17.5)
LYMPHOCYTES ABSOLUTE: 1.9 K/UL (ref 1–5.1)
LYMPHOCYTES RELATIVE PERCENT: 23.8 %
MCH RBC QN AUTO: 31.8 PG (ref 26–34)
MCHC RBC AUTO-ENTMCNC: 33.9 G/DL (ref 31–36)
MCV RBC AUTO: 93.9 FL (ref 80–100)
MONOCYTES ABSOLUTE: 1.2 K/UL (ref 0–1.3)
MONOCYTES RELATIVE PERCENT: 15 %
NEUTROPHILS ABSOLUTE: 4.4 K/UL (ref 1.7–7.7)
NEUTROPHILS RELATIVE PERCENT: 54.9 %
PDW BLD-RTO: 14.4 % (ref 12.4–15.4)
PLATELET # BLD: 162 K/UL (ref 135–450)
PMV BLD AUTO: 8.3 FL (ref 5–10.5)
POTASSIUM REFLEX MAGNESIUM: 4.3 MMOL/L (ref 3.5–5.1)
RBC # BLD: 4.53 M/UL (ref 4.2–5.9)
SODIUM BLD-SCNC: 137 MMOL/L (ref 136–145)
TOTAL PROTEIN: 8.3 G/DL (ref 6.4–8.2)
TROPONIN: <0.01 NG/ML
WBC # BLD: 7.9 K/UL (ref 4–11)

## 2021-06-21 PROCEDURE — 71046 X-RAY EXAM CHEST 2 VIEWS: CPT

## 2021-06-21 PROCEDURE — 80053 COMPREHEN METABOLIC PANEL: CPT

## 2021-06-21 PROCEDURE — 99283 EMERGENCY DEPT VISIT LOW MDM: CPT

## 2021-06-21 PROCEDURE — 84145 PROCALCITONIN (PCT): CPT

## 2021-06-21 PROCEDURE — 85025 COMPLETE CBC W/AUTO DIFF WBC: CPT

## 2021-06-21 PROCEDURE — 93005 ELECTROCARDIOGRAM TRACING: CPT | Performed by: EMERGENCY MEDICINE

## 2021-06-21 PROCEDURE — 84484 ASSAY OF TROPONIN QUANT: CPT

## 2021-06-22 VITALS
BODY MASS INDEX: 22.26 KG/M2 | HEART RATE: 62 BPM | DIASTOLIC BLOOD PRESSURE: 59 MMHG | SYSTOLIC BLOOD PRESSURE: 135 MMHG | RESPIRATION RATE: 20 BRPM | OXYGEN SATURATION: 93 % | TEMPERATURE: 99.9 F | HEIGHT: 62 IN | WEIGHT: 121 LBS

## 2021-06-22 LAB
EKG ATRIAL RATE: 70 BPM
EKG DIAGNOSIS: NORMAL
EKG P AXIS: 69 DEGREES
EKG P-R INTERVAL: 200 MS
EKG Q-T INTERVAL: 388 MS
EKG QRS DURATION: 96 MS
EKG QTC CALCULATION (BAZETT): 419 MS
EKG R AXIS: 13 DEGREES
EKG T AXIS: 78 DEGREES
EKG VENTRICULAR RATE: 70 BPM
PROCALCITONIN: 0.09 NG/ML (ref 0–0.15)

## 2021-06-22 PROCEDURE — 6370000000 HC RX 637 (ALT 250 FOR IP): Performed by: STUDENT IN AN ORGANIZED HEALTH CARE EDUCATION/TRAINING PROGRAM

## 2021-06-22 PROCEDURE — 93010 ELECTROCARDIOGRAM REPORT: CPT | Performed by: INTERNAL MEDICINE

## 2021-06-22 RX ORDER — BENZONATATE 100 MG/1
100 CAPSULE ORAL ONCE
Status: COMPLETED | OUTPATIENT
Start: 2021-06-22 | End: 2021-06-22

## 2021-06-22 RX ORDER — BENZONATATE 100 MG/1
100 CAPSULE ORAL 3 TIMES DAILY PRN
Qty: 21 CAPSULE | Refills: 0 | Status: SHIPPED | OUTPATIENT
Start: 2021-06-22 | End: 2021-06-29

## 2021-06-22 RX ADMIN — BENZONATATE 100 MG: 100 CAPSULE ORAL at 01:49

## 2021-06-22 NOTE — ED PROVIDER NOTES
[address] ED      CHIEF COMPLAINT  Shortness of Breath (SOB, cough, fatigue, febrile, x 3 days )     HISTORY OF PRESENT ILLNESS  Lavinia Marmolejo is a 80 y.o. male  who presents to the ED complaining of cough productive of sputum, fatigue, and fever for the past 3 days. Patient states that he has not been feeling well for the past 3 days, has had a productive cough over this time as well. Has a previous history of pneumonia, states that before his pneumonia did not show up on chest x-ray. He has not visualized sputum so he cannot tell me what it looks like. He states that he is always short of breath and that is a chronic issue for him and is unchanged. He arrives with his daughter who states that she took his temperature earlier and it was found to be 103 with infrared thermometer. He denies any abdominal pain, nausea vomiting, diarrhea constipation, or other complaints or concerns. He states that he has been drinking Crown Royal for symptoms of it as it is better than any other medication we can give him. He denies any chest pain. States that he feels similar to when he had pneumonia in the past.  Denies any other complaints or concerns. No other complaints, modifying factors or associated symptoms. I have reviewed the following from the nursing documentation.     Past Medical History:   Diagnosis Date    Chronic systolic CHF (congestive heart failure) (HCC)     Heart disease     Hyperlipidemia     Hypertension      Past Surgical History:   Procedure Laterality Date    CATARACT REMOVAL WITH IMPLANT Left 3/16/16    EYE SURGERY Right 1/21/16    Cataract removal with implant Dr. Stefan Hoffman History   Problem Relation Age of Onset    Heart Disease Mother     High Blood Pressure Mother     Heart Disease Father     High Blood Pressure Father     Diabetes Brother     Heart Disease Brother     Stroke Brother      Social History     Socioeconomic History  Marital status:      Spouse name: Not on file    Number of children: Not on file    Years of education: Not on file    Highest education level: Not on file   Occupational History    Not on file   Tobacco Use    Smoking status: Former Smoker    Smokeless tobacco: Never Used   Vaping Use    Vaping Use: Never used   Substance and Sexual Activity    Alcohol use: No    Drug use: No    Sexual activity: Not Currently   Other Topics Concern    Not on file   Social History Narrative    Not on file     Social Determinants of Health     Financial Resource Strain:     Difficulty of Paying Living Expenses:    Food Insecurity:     Worried About [employer] in the Last Year:     [employer] in the Last Year:    Transportation Needs:     Lack of Transportation (Medical):      Lack of Transportation (Non-Medical):    Physical Activity:     Days of Exercise per Week:     Minutes of Exercise per Session:    Stress:     Feeling of Stress :    Social Connections:     Frequency of Communication with Friends and Family:     Frequency of Social Gatherings with Friends and Family:     Attends Orthodoxy Services:     Active Member of Clubs or Organizations:     Attends Club or Organization Meetings:     Marital Status:    Intimate Partner Violence:     Fear of Current or Ex-Partner:     Emotionally Abused:     Physically Abused:     Sexually Abused:      Current Facility-Administered Medications   Medication Dose Route Frequency Provider Last Rate Last Admin    benzonatate (TESSALON) capsule 100 mg  100 mg Oral Once Usha Graham MD         Current Outpatient Medications   Medication Sig Dispense Refill    benzonatate (TESSALON) 100 MG capsule Take 1 capsule by mouth 3 times daily as needed for Cough 21 capsule 0    atorvastatin (LIPITOR) 10 MG tablet Take 1 tablet by mouth daily 90 tablet 3    lisinopril (PRINIVIL;ZESTRIL) 20 MG tablet Take 1 tablet by mouth daily 90 tablet 3    Eosinophils % 4.4 %    Basophils % 1.9 %    Neutrophils Absolute 4.4 1.7 - 7.7 K/uL    Lymphocytes Absolute 1.9 1.0 - 5.1 K/uL    Monocytes Absolute 1.2 0.0 - 1.3 K/uL    Eosinophils Absolute 0.4 0.0 - 0.6 K/uL    Basophils Absolute 0.1 0.0 - 0.2 K/uL   Comprehensive Metabolic Panel w/ Reflex to MG   Result Value Ref Range    Sodium 137 136 - 145 mmol/L    Potassium reflex Magnesium 4.3 3.5 - 5.1 mmol/L    Chloride 102 99 - 110 mmol/L    CO2 24 21 - 32 mmol/L    Anion Gap 11 3 - 16    Glucose 90 70 - 99 mg/dL    BUN 24 (H) 7 - 20 mg/dL    CREATININE 1.1 0.8 - 1.3 mg/dL    GFR Non-African American >60 >60    GFR African American >60 >60    Calcium 9.5 8.3 - 10.6 mg/dL    Total Protein 8.3 (H) 6.4 - 8.2 g/dL    Albumin 4.3 3.4 - 5.0 g/dL    Albumin/Globulin Ratio 1.1 1.1 - 2.2    Total Bilirubin 1.2 (H) 0.0 - 1.0 mg/dL    Alkaline Phosphatase 87 40 - 129 U/L    ALT 16 10 - 40 U/L    AST 25 15 - 37 U/L    Globulin 4.0 g/dL   Troponin   Result Value Ref Range    Troponin <0.01 <0.01 ng/mL   Procalcitonin   Result Value Ref Range    Procalcitonin 0.09 0.00 - 0.15 ng/mL   EKG 12 Lead   Result Value Ref Range    Ventricular Rate 70 BPM    Atrial Rate 70 BPM    P-R Interval 200 ms    QRS Duration 96 ms    Q-T Interval 388 ms    QTc Calculation (Bazett) 419 ms    P Axis 69 degrees    R Axis 13 degrees    T Axis 78 degrees    Diagnosis       Sinus rhythm with occasional and consecutive Premature ventricular complexesSeptal infarct , age undeterminedST & T wave abnormality, consider lateral ischemiaAbnormal ECGNo previous ECGs available       ECG  The Ekg interpreted by me shows  normal sinus rhythm with a rate of 70, occasional PVCs  Axis is   Normal  QTc is  normal  Intervals and Durations are unremarkable. ST Segments: nonspecific changes, ST depressions and inversions in leads V4 through V6 similar compared to previous performed 3/15/2019    RADIOLOGY  XR CHEST (2 VW)   Final Result   No acute process.            ED COURSE/MDM  Patient seen and evaluated. Old records reviewed. Labs and imaging reviewed and results discussed with patient. Is an 80-year-old male, presenting with concerns for fevers, cough and fatigue for the past 3 days. Full HPI as detailed above. Upon arrival in the ED, vitals reassuring. Patient is resting comfortably and is no acute distress. He is afebrile and appears nontoxic.'s are clear to auscultation bilaterally. He is concerned for possible pneumonia. Labs performed, no acute concerning electrolyte abnormalities. No leukocytosis or anemia. Chest x-ray without any acute process. EKG without evidence of acute ischemic changes. Troponin is negative. Flu and Covid swab was ordered however patient refused. He has been fully vaccinated against Covid per his reports. Patient was reassessed, informed of the findings. His procalcitonin was also negative. I do not suspect underlying pneumonia that was not visualized on chest x-ray at this point. Patient be discharged home. He is given return precautions for the ED, advised to follow-up with his PCP. Patient and daughter comfortable in agreement with plan of care. It was on the prescription for Tessalon Perles help with his cough, advised follow-up with PCP and given strict return precautions for any worsening symptoms. During the patient's ED course, the patient was given:  Medications   benzonatate (TESSALON) capsule 100 mg (has no administration in time range)        CLINICAL IMPRESSION  1. Viral URI with cough        Blood pressure (!) 145/57, pulse 61, temperature 99.9 °F (37.7 °C), temperature source Oral, resp. rate 17, height 5' 2\" (1.575 m), weight 121 lb (54.9 kg), SpO2 95 %. Benny Jacome was discharged to home in good condition. Patient was given scripts for the following medications. I counseled patient how to take these medications.    New Prescriptions    BENZONATATE (TESSALON) 100 MG CAPSULE    Take 1 capsule by mouth 3 times daily as needed for Cough       Follow-up with:  Segundo Ha MD  [address], [address] [facility]  [phone]    Schedule an appointment as soon as possible for a visit   As needed    [place] [facility]  [address] [facility]  [phone]  Go to   If symptoms worsen      DISCLAIMER: This chart was created using Dragon dictation software. Efforts were made by me to ensure accuracy, however some errors may be present due to limitations of this technology and occasionally words are not transcribed correctly.        Renetta Flores MD  06/22/21 [id number]

## 2021-06-22 NOTE — ED NOTES
Pt refusing COVID swab. He says he has been vaccinated and does not want to be swabbed.       Dinah Cao RN  06/21/21 9903

## 2021-08-19 ENCOUNTER — APPOINTMENT (OUTPATIENT)
Dept: GENERAL RADIOLOGY | Age: 86
End: 2021-08-19
Payer: MEDICARE

## 2021-08-19 ENCOUNTER — HOSPITAL ENCOUNTER (OUTPATIENT)
Age: 86
Setting detail: OBSERVATION
Discharge: HOME | End: 2021-08-20
Attending: STUDENT IN AN ORGANIZED HEALTH CARE EDUCATION/TRAINING PROGRAM | Admitting: INTERNAL MEDICINE
Payer: MEDICARE

## 2021-08-19 DIAGNOSIS — R22.0 LIP SWELLING: ICD-10-CM

## 2021-08-19 DIAGNOSIS — T78.3XXA ANGIOEDEMA, INITIAL ENCOUNTER: Primary | ICD-10-CM

## 2021-08-19 LAB
A/G RATIO: 1.2 (ref 1.1–2.2)
ALBUMIN SERPL-MCNC: 4 G/DL (ref 3.4–5)
ALP BLD-CCNC: 86 U/L (ref 40–129)
ALT SERPL-CCNC: 11 U/L (ref 10–40)
ANION GAP SERPL CALCULATED.3IONS-SCNC: 10 MMOL/L (ref 3–16)
AST SERPL-CCNC: 16 U/L (ref 15–37)
BASOPHILS ABSOLUTE: 0 K/UL (ref 0–0.2)
BASOPHILS RELATIVE PERCENT: 0.8 %
BILIRUB SERPL-MCNC: 1.2 MG/DL (ref 0–1)
BUN BLDV-MCNC: 21 MG/DL (ref 7–20)
CALCIUM SERPL-MCNC: 8.8 MG/DL (ref 8.3–10.6)
CHLORIDE BLD-SCNC: 107 MMOL/L (ref 99–110)
CO2: 21 MMOL/L (ref 21–32)
CREAT SERPL-MCNC: 0.9 MG/DL (ref 0.8–1.3)
EKG ATRIAL RATE: 52 BPM
EKG DIAGNOSIS: NORMAL
EKG P AXIS: 71 DEGREES
EKG P-R INTERVAL: 188 MS
EKG Q-T INTERVAL: 436 MS
EKG QRS DURATION: 100 MS
EKG QTC CALCULATION (BAZETT): 405 MS
EKG R AXIS: 9 DEGREES
EKG T AXIS: 67 DEGREES
EKG VENTRICULAR RATE: 52 BPM
EOSINOPHILS ABSOLUTE: 0.1 K/UL (ref 0–0.6)
EOSINOPHILS RELATIVE PERCENT: 1.8 %
GFR AFRICAN AMERICAN: >60
GFR NON-AFRICAN AMERICAN: >60
GLOBULIN: 3.4 G/DL
GLUCOSE BLD-MCNC: 88 MG/DL (ref 70–99)
HCT VFR BLD CALC: 42.5 % (ref 40.5–52.5)
HEMOGLOBIN: 14.4 G/DL (ref 13.5–17.5)
LYMPHOCYTES ABSOLUTE: 1.5 K/UL (ref 1–5.1)
LYMPHOCYTES RELATIVE PERCENT: 24.6 %
MCH RBC QN AUTO: 31.6 PG (ref 26–34)
MCHC RBC AUTO-ENTMCNC: 33.8 G/DL (ref 31–36)
MCV RBC AUTO: 93.4 FL (ref 80–100)
MONOCYTES ABSOLUTE: 0.6 K/UL (ref 0–1.3)
MONOCYTES RELATIVE PERCENT: 9 %
NEUTROPHILS ABSOLUTE: 3.9 K/UL (ref 1.7–7.7)
NEUTROPHILS RELATIVE PERCENT: 63.8 %
PDW BLD-RTO: 14 % (ref 12.4–15.4)
PLATELET # BLD: 178 K/UL (ref 135–450)
PMV BLD AUTO: 8.2 FL (ref 5–10.5)
POTASSIUM REFLEX MAGNESIUM: 4 MMOL/L (ref 3.5–5.1)
PRO-BNP: 336 PG/ML (ref 0–449)
RBC # BLD: 4.55 M/UL (ref 4.2–5.9)
SODIUM BLD-SCNC: 138 MMOL/L (ref 136–145)
TOTAL PROTEIN: 7.4 G/DL (ref 6.4–8.2)
TROPONIN: <0.01 NG/ML
WBC # BLD: 6.2 K/UL (ref 4–11)

## 2021-08-19 PROCEDURE — 6370000000 HC RX 637 (ALT 250 FOR IP): Performed by: INTERNAL MEDICINE

## 2021-08-19 PROCEDURE — 96375 TX/PRO/DX INJ NEW DRUG ADDON: CPT

## 2021-08-19 PROCEDURE — G0378 HOSPITAL OBSERVATION PER HR: HCPCS

## 2021-08-19 PROCEDURE — 71045 X-RAY EXAM CHEST 1 VIEW: CPT

## 2021-08-19 PROCEDURE — 6360000002 HC RX W HCPCS: Performed by: PHYSICIAN ASSISTANT

## 2021-08-19 PROCEDURE — 84484 ASSAY OF TROPONIN QUANT: CPT

## 2021-08-19 PROCEDURE — 2580000003 HC RX 258: Performed by: INTERNAL MEDICINE

## 2021-08-19 PROCEDURE — 83880 ASSAY OF NATRIURETIC PEPTIDE: CPT

## 2021-08-19 PROCEDURE — 93010 ELECTROCARDIOGRAM REPORT: CPT | Performed by: INTERNAL MEDICINE

## 2021-08-19 PROCEDURE — 85025 COMPLETE CBC W/AUTO DIFF WBC: CPT

## 2021-08-19 PROCEDURE — 96374 THER/PROPH/DIAG INJ IV PUSH: CPT

## 2021-08-19 PROCEDURE — 93005 ELECTROCARDIOGRAM TRACING: CPT | Performed by: PHYSICIAN ASSISTANT

## 2021-08-19 PROCEDURE — 80053 COMPREHEN METABOLIC PANEL: CPT

## 2021-08-19 PROCEDURE — 2500000003 HC RX 250 WO HCPCS: Performed by: PHYSICIAN ASSISTANT

## 2021-08-19 PROCEDURE — 36415 COLL VENOUS BLD VENIPUNCTURE: CPT

## 2021-08-19 PROCEDURE — 99285 EMERGENCY DEPT VISIT HI MDM: CPT

## 2021-08-19 PROCEDURE — 96372 THER/PROPH/DIAG INJ SC/IM: CPT

## 2021-08-19 PROCEDURE — 6360000002 HC RX W HCPCS: Performed by: INTERNAL MEDICINE

## 2021-08-19 RX ORDER — ASPIRIN 81 MG/1
81 TABLET, CHEWABLE ORAL EVERY OTHER DAY
Status: DISCONTINUED | OUTPATIENT
Start: 2021-08-21 | End: 2021-08-20 | Stop reason: HOSPADM

## 2021-08-19 RX ORDER — AMLODIPINE BESYLATE 5 MG/1
5 TABLET ORAL DAILY
Status: DISCONTINUED | OUTPATIENT
Start: 2021-08-20 | End: 2021-08-20 | Stop reason: HOSPADM

## 2021-08-19 RX ORDER — METHYLPREDNISOLONE SODIUM SUCCINATE 40 MG/ML
40 INJECTION, POWDER, LYOPHILIZED, FOR SOLUTION INTRAMUSCULAR; INTRAVENOUS EVERY 6 HOURS
Status: DISCONTINUED | OUTPATIENT
Start: 2021-08-19 | End: 2021-08-19

## 2021-08-19 RX ORDER — SODIUM CHLORIDE 0.9 % (FLUSH) 0.9 %
5-40 SYRINGE (ML) INJECTION EVERY 12 HOURS SCHEDULED
Status: DISCONTINUED | OUTPATIENT
Start: 2021-08-19 | End: 2021-08-20 | Stop reason: HOSPADM

## 2021-08-19 RX ORDER — CARVEDILOL 6.25 MG/1
12.5 TABLET ORAL 2 TIMES DAILY
Status: DISCONTINUED | OUTPATIENT
Start: 2021-08-19 | End: 2021-08-20 | Stop reason: HOSPADM

## 2021-08-19 RX ORDER — SODIUM CHLORIDE 9 MG/ML
INJECTION, SOLUTION INTRAVENOUS CONTINUOUS
Status: DISCONTINUED | OUTPATIENT
Start: 2021-08-19 | End: 2021-08-19

## 2021-08-19 RX ORDER — DIPHENHYDRAMINE HCL 25 MG
25 TABLET ORAL EVERY 6 HOURS SCHEDULED
Status: DISCONTINUED | OUTPATIENT
Start: 2021-08-19 | End: 2021-08-20 | Stop reason: HOSPADM

## 2021-08-19 RX ORDER — DIGOXIN 125 MCG
125 TABLET ORAL DAILY
Status: DISCONTINUED | OUTPATIENT
Start: 2021-08-20 | End: 2021-08-20 | Stop reason: HOSPADM

## 2021-08-19 RX ORDER — ACETAMINOPHEN 325 MG/1
650 TABLET ORAL EVERY 6 HOURS PRN
Status: DISCONTINUED | OUTPATIENT
Start: 2021-08-19 | End: 2021-08-20 | Stop reason: HOSPADM

## 2021-08-19 RX ORDER — DIPHENHYDRAMINE HYDROCHLORIDE 50 MG/ML
25 INJECTION INTRAMUSCULAR; INTRAVENOUS ONCE
Status: DISCONTINUED | OUTPATIENT
Start: 2021-08-19 | End: 2021-08-19

## 2021-08-19 RX ORDER — METHYLPREDNISOLONE SODIUM SUCCINATE 40 MG/ML
40 INJECTION, POWDER, LYOPHILIZED, FOR SOLUTION INTRAMUSCULAR; INTRAVENOUS EVERY 8 HOURS
Status: DISCONTINUED | OUTPATIENT
Start: 2021-08-20 | End: 2021-08-20 | Stop reason: HOSPADM

## 2021-08-19 RX ORDER — SODIUM CHLORIDE 9 MG/ML
25 INJECTION, SOLUTION INTRAVENOUS PRN
Status: DISCONTINUED | OUTPATIENT
Start: 2021-08-19 | End: 2021-08-20 | Stop reason: HOSPADM

## 2021-08-19 RX ORDER — POTASSIUM CHLORIDE 20 MEQ/1
40 TABLET, EXTENDED RELEASE ORAL PRN
Status: DISCONTINUED | OUTPATIENT
Start: 2021-08-19 | End: 2021-08-20 | Stop reason: HOSPADM

## 2021-08-19 RX ORDER — SODIUM CHLORIDE 0.9 % (FLUSH) 0.9 %
5-40 SYRINGE (ML) INJECTION PRN
Status: DISCONTINUED | OUTPATIENT
Start: 2021-08-19 | End: 2021-08-20 | Stop reason: HOSPADM

## 2021-08-19 RX ORDER — DEXAMETHASONE SODIUM PHOSPHATE 10 MG/ML
10 INJECTION, SOLUTION INTRAMUSCULAR; INTRAVENOUS ONCE
Status: COMPLETED | OUTPATIENT
Start: 2021-08-19 | End: 2021-08-19

## 2021-08-19 RX ORDER — DIPHENHYDRAMINE HYDROCHLORIDE 50 MG/ML
25 INJECTION INTRAMUSCULAR; INTRAVENOUS ONCE
Status: COMPLETED | OUTPATIENT
Start: 2021-08-19 | End: 2021-08-19

## 2021-08-19 RX ORDER — ONDANSETRON 4 MG/1
4 TABLET, ORALLY DISINTEGRATING ORAL EVERY 8 HOURS PRN
Status: DISCONTINUED | OUTPATIENT
Start: 2021-08-19 | End: 2021-08-20 | Stop reason: HOSPADM

## 2021-08-19 RX ORDER — SODIUM CHLORIDE 9 MG/ML
INJECTION, SOLUTION INTRAVENOUS CONTINUOUS
Status: ACTIVE | OUTPATIENT
Start: 2021-08-19 | End: 2021-08-20

## 2021-08-19 RX ORDER — POTASSIUM CHLORIDE 7.45 MG/ML
10 INJECTION INTRAVENOUS PRN
Status: DISCONTINUED | OUTPATIENT
Start: 2021-08-19 | End: 2021-08-20 | Stop reason: HOSPADM

## 2021-08-19 RX ORDER — DIPHENHYDRAMINE HYDROCHLORIDE 50 MG/ML
25 INJECTION INTRAMUSCULAR; INTRAVENOUS EVERY 6 HOURS PRN
Status: DISCONTINUED | OUTPATIENT
Start: 2021-08-19 | End: 2021-08-20 | Stop reason: HOSPADM

## 2021-08-19 RX ORDER — MAGNESIUM SULFATE 1 G/100ML
1000 INJECTION INTRAVENOUS PRN
Status: DISCONTINUED | OUTPATIENT
Start: 2021-08-19 | End: 2021-08-20 | Stop reason: HOSPADM

## 2021-08-19 RX ORDER — ATORVASTATIN CALCIUM 10 MG/1
10 TABLET, FILM COATED ORAL DAILY
Status: DISCONTINUED | OUTPATIENT
Start: 2021-08-19 | End: 2021-08-20 | Stop reason: HOSPADM

## 2021-08-19 RX ORDER — ACETAMINOPHEN 650 MG/1
650 SUPPOSITORY RECTAL EVERY 6 HOURS PRN
Status: DISCONTINUED | OUTPATIENT
Start: 2021-08-19 | End: 2021-08-20 | Stop reason: HOSPADM

## 2021-08-19 RX ORDER — DEXAMETHASONE SODIUM PHOSPHATE 10 MG/ML
10 INJECTION, SOLUTION INTRAMUSCULAR; INTRAVENOUS ONCE
Status: DISCONTINUED | OUTPATIENT
Start: 2021-08-19 | End: 2021-08-19

## 2021-08-19 RX ORDER — POLYETHYLENE GLYCOL 3350 17 G/17G
17 POWDER, FOR SOLUTION ORAL DAILY PRN
Status: DISCONTINUED | OUTPATIENT
Start: 2021-08-19 | End: 2021-08-20 | Stop reason: HOSPADM

## 2021-08-19 RX ORDER — ONDANSETRON 2 MG/ML
4 INJECTION INTRAMUSCULAR; INTRAVENOUS EVERY 6 HOURS PRN
Status: DISCONTINUED | OUTPATIENT
Start: 2021-08-19 | End: 2021-08-20 | Stop reason: HOSPADM

## 2021-08-19 RX ADMIN — ENOXAPARIN SODIUM 40 MG: 40 INJECTION SUBCUTANEOUS at 20:58

## 2021-08-19 RX ADMIN — CARVEDILOL 12.5 MG: 6.25 TABLET, FILM COATED ORAL at 20:52

## 2021-08-19 RX ADMIN — METHYLPREDNISOLONE SODIUM SUCCINATE 40 MG: 40 INJECTION, POWDER, FOR SOLUTION INTRAMUSCULAR; INTRAVENOUS at 20:50

## 2021-08-19 RX ADMIN — DEXAMETHASONE SODIUM PHOSPHATE 10 MG: 10 INJECTION, SOLUTION INTRAMUSCULAR; INTRAVENOUS at 15:37

## 2021-08-19 RX ADMIN — ATORVASTATIN CALCIUM 10 MG: 10 TABLET, FILM COATED ORAL at 20:52

## 2021-08-19 RX ADMIN — FAMOTIDINE 20 MG: 10 INJECTION, SOLUTION INTRAVENOUS at 15:35

## 2021-08-19 RX ADMIN — SODIUM CHLORIDE: 9 INJECTION, SOLUTION INTRAVENOUS at 20:50

## 2021-08-19 RX ADMIN — DIPHENHYDRAMINE HYDROCHLORIDE 25 MG: 50 INJECTION, SOLUTION INTRAMUSCULAR; INTRAVENOUS at 15:36

## 2021-08-19 ASSESSMENT — ENCOUNTER SYMPTOMS
VOMITING: 0
SHORTNESS OF BREATH: 1
TROUBLE SWALLOWING: 1
ABDOMINAL PAIN: 0
SORE THROAT: 1
COUGH: 0

## 2021-08-19 NOTE — ED NOTES
[facility] lab and sent down due to hemolysis. Pt ambulatory to bathroom with daughter. Pt has steady gait requiring no assistance. Pt returned back to bed.       Nannette Cid RN  08/19/21 1766

## 2021-08-19 NOTE — ED PROVIDER NOTES
I independently examined and evaluated Vitaliy Yepez. In brief, patient is an 24-year-old male, presenting with concerns for upper lip swelling and feelings of throat swelling. Patient does take lisinopril, this is never happened to him in the past.  States that earlier today he noticed that he was having swelling of his upper lip and is also feeling shortness of breath and feels like he has some throat swelling and sore throat as well. He has no difficulty tolerating secretions. He has not taken any medications for his symptoms. He denies other complaints or concerns. Focused exam revealed patient resting comfortably, no acute distress. Angioedema of the upper lip, no visible posterior pharyngeal swelling or submandibular swelling or tongue swelling. No difficulty tolerating secretions. No stridor. Lungs clear to auscultation bilaterally. Heart regular rate and rhythm. Abdomen soft, nondistended, nontender. No peripheral edema. Labs Reviewed   COMPREHENSIVE METABOLIC PANEL W/ REFLEX TO MG FOR LOW K - Abnormal; Notable for the following components:       Result Value    BUN 21 (*)     Total Bilirubin 1.2 (*)     All other components within normal limits    Narrative:     Performed at:  [facility],  [place], [facility] [place]   Phone [phone]   CBC WITH AUTO DIFFERENTIAL    Narrative:     Performed at:  [facility] [address],  [place], [facility] [place]   Phone [phone]   TROPONIN    Narrative:     Performed at:  [facility] [address],  [place], [facility] [place]   Phone [phone]   BRAIN NATRIURETIC PEPTIDE    Narrative:     Performed at:  [facility],  [place], [facility] [place]   Phone [phone]     XR CHEST PORTABLE   Final Result   Stable chronic changes with no acute abnormality seen.            ED course: Patient is an 26-year-old male, presenting with concerns for lip swelling and the feeling of throat swelling as well as shortness of breath, findings consistent with angioedema. Patient was seen in conjunction with BLAKE Rivera, please refer to her note for further details of patient's work-up and treatment. Labs were performed here and are reassuring. He was treated without significant improvement of his symptoms here in the department however they are not worsening. No difficulty tolerating secretions or concern for airway compromise, however given the extent of his angioedema feel that he would benefit from hospitalization for close monitoring for further work-up and treatment until his symptoms began to improve. Patient is comfortable in agreement with the plan of care and will be hospitalized. All diagnostic, treatment, and disposition decisions were made by myself in conjunction with the advanced practice provider. For all further details of the patient's emergency department visit, please see the advanced practice provider's documentation. The Ekg interpreted by me shows  sinus bradycardia, rate=52   Axis is   Normal  QTc is  normal  Intervals and Durations are unremarkable. ST Segments: nonspecific changes  No significant change from prior EKG dated 6/21/2021    1. Angioedema, initial encounter    2. Lip swelling      Comment: Please note this report has been produced using speech recognition software and may contain errors related to that system including errors in grammar, punctuation, and spelling, as well as words and phrases that may be inappropriate. If there are any questions or concerns please feel free to contact the dictating provider for clarification.        Dat Andre MD  08/19/21 0193

## 2021-08-19 NOTE — ED PROVIDER NOTES
[address] ED  EMERGENCY DEPARTMENT ENCOUNTER        Pt Name: Josiah Menchaca  MRN: [id number]  [place] 1934  Date of evaluation: 8/19/2021  Provider: Elsie Amado PA-C  PCP: Melany Cotto MD    Shared Visit or Autonomous Visit:  I have seen and evaluated this patient with my supervising physician Svetlana Renner MD.    [address] [facility]       Chief Complaint   Patient presents with    Oral Swelling       HISTORY OF PRESENT ILLNESS   (Location/Symptom, Timing/Onset, Context/Setting, Quality, Duration, Modifying Factors, Severity)  Note limiting factors. Josiah Menchaca is a 80 y.o. male presenting to the emergency department for lip swelling started this morning when he got up got up about 3:30 AM ate his breakfast took his medication including his lisinopril felt like his throat was sore when he got up like he is having a little bit difficulty swallowing he looked in the mirror and then noticed that his upper lip was swollen. Also feeling short of breath this morning when he was walking he has history of chronic CHF, shortness of breath felt worse than his usual.  No chest pain. No leg swelling. No cough or recent illness. No new exposures. Has been on lisinopril for about 4 to 5 years. The history is provided by the patient and a relative. Nursing Notes were reviewed    REVIEW OF SYSTEMS    (2-9 systems for level 4, 10 or more for level 5)     Review of Systems   Constitutional: Negative for fever. HENT: Positive for sore throat and trouble swallowing. Lip swelling   Respiratory: Positive for shortness of breath. Negative for cough. Cardiovascular: Negative for chest pain and leg swelling. Gastrointestinal: Negative for abdominal pain and vomiting. All other systems reviewed and are negative. Positives and Pertinent negatives as per HPI.        PAST MEDICAL HISTORY     Past Medical History:   Diagnosis Date    Chronic systolic CHF (congestive heart failure) ([facility] [address]     Heart disease     Hyperlipidemia     Hypertension          SURGICAL HISTORY     Past Surgical History:   Procedure Laterality Date    CATARACT REMOVAL WITH IMPLANT Left 3/16/16    EYE SURGERY Right 1/21/16    Cataract removal with implant Dr. Nick Chandler       Previous Medications    AMLODIPINE (NORVASC) 5 MG TABLET    Take 1 tablet by mouth daily    ASPIRIN 81 MG CHEWABLE TABLET    Take 1 tablet by mouth daily. ATORVASTATIN (LIPITOR) 10 MG TABLET    Take 1 tablet by mouth daily    CARVEDILOL (COREG) 12.5 MG TABLET    Take 1 tablet by mouth 2 times daily    DIGOXIN (LANOXIN) 125 MCG TABLET    Take 1 tablet by mouth daily    LISINOPRIL (PRINIVIL;ZESTRIL) 20 MG TABLET    Take 1 tablet by mouth daily         ALLERGIES     Ace inhibitors    FAMILYHISTORY       Family History   Problem Relation Age of Onset    Heart Disease Mother     High Blood Pressure Mother     Heart Disease Father     High Blood Pressure Father     Diabetes Brother     Heart Disease Brother     Stroke Brother           SOCIAL HISTORY       Social History     Socioeconomic History    Marital status:      Spouse name: None    Number of children: None    Years of education: None    Highest education level: None   Occupational History    None   Tobacco Use    Smoking status: Former Smoker    Smokeless tobacco: Never Used   Vaping Use    Vaping Use: Never used   Substance and Sexual Activity    Alcohol use: No    Drug use: No    Sexual activity: Not Currently   Other Topics Concern    None   Social History Narrative    None     Social Determinants of Health     Financial Resource Strain:     Difficulty of Paying Living Expenses:    Food Insecurity:     Worried About Running Out of Food in the Last Year:     Ran Out of Food in the Last Year:    Transportation Needs:     Lack of Transportation (Medical):      Lack of Transportation Left lower leg: No edema. Skin:     General: Skin is warm and dry. Neurological:      Mental Status: He is alert and oriented to person, place, and time. GCS: GCS eye subscore is 4. GCS verbal subscore is 5. GCS motor subscore is 6. Cranial Nerves: No cranial nerve deficit. Sensory: No sensory deficit. Motor: No abnormal muscle tone. Coordination: Coordination normal.   Psychiatric:         Speech: Speech normal.         Behavior: Behavior normal.         Thought Content:  Thought content normal.         DIAGNOSTIC RESULTS   LABS:    Labs Reviewed   COMPREHENSIVE METABOLIC PANEL W/ REFLEX TO MG FOR LOW K - Abnormal; Notable for the following components:       Result Value    BUN 21 (*)     Total Bilirubin 1.2 (*)     All other components within normal limits    Narrative:     Performed at:  [facility],  [place], [facility]   Phone [phone]   CBC WITH AUTO DIFFERENTIAL    Narrative:     Performed at:  [facility],  [place], [facility]   Phone [phone]   TROPONIN    Narrative:     Performed at:  [facility],  [place], [facility]   Phone [phone]   BRAIN NATRIURETIC PEPTIDE    Narrative:     Performed at:  [place] [address],  [place], [facility]   Phone [phone]   CBC WITH AUTO DIFFERENTIAL   COMPREHENSIVE METABOLIC PANEL W/ REFLEX TO MG FOR LOW K     Results for orders placed or performed during the hospital encounter of 08/19/21   CBC Auto Differential   Result Value Ref Range    WBC 6.2 4.0 - 11.0 K/uL    RBC 4.55 4.20 - 5.90 M/uL    Hemoglobin 14.4 13.5 - 17.5 g/dL    Hematocrit 42.5 40.5 - 52.5 %    MCV 93.4 80.0 - 100.0 fL    MCH 31.6 26.0 - 34.0 pg    MCHC 33.8 31.0 - 36.0 g/dL    RDW 14.0 12.4 - 15.4 %    Platelets 196 694 - 171 K/uL    MPV 8.2 5.0 - 10.5 fL Neutrophils % 63.8 %    Lymphocytes % 24.6 %    Monocytes % 9.0 %    Eosinophils % 1.8 %    Basophils % 0.8 %    Neutrophils Absolute 3.9 1.7 - 7.7 K/uL    Lymphocytes Absolute 1.5 1.0 - 5.1 K/uL    Monocytes Absolute 0.6 0.0 - 1.3 K/uL    Eosinophils Absolute 0.1 0.0 - 0.6 K/uL    Basophils Absolute 0.0 0.0 - 0.2 K/uL   Comprehensive Metabolic Panel w/ Reflex to MG   Result Value Ref Range    Sodium 138 136 - 145 mmol/L    Potassium reflex Magnesium 4.0 3.5 - 5.1 mmol/L    Chloride 107 99 - 110 mmol/L    CO2 21 21 - 32 mmol/L    Anion Gap 10 3 - 16    Glucose 88 70 - 99 mg/dL    BUN 21 (H) 7 - 20 mg/dL    CREATININE 0.9 0.8 - 1.3 mg/dL    GFR Non-African American >60 >60    GFR African American >60 >60    Calcium 8.8 8.3 - 10.6 mg/dL    Total Protein 7.4 6.4 - 8.2 g/dL    Albumin 4.0 3.4 - 5.0 g/dL    Albumin/Globulin Ratio 1.2 1.1 - 2.2    Total Bilirubin 1.2 (H) 0.0 - 1.0 mg/dL    Alkaline Phosphatase 86 40 - 129 U/L    ALT 11 10 - 40 U/L    AST 16 15 - 37 U/L    Globulin 3.4 g/dL   Troponin   Result Value Ref Range    Troponin <0.01 <0.01 ng/mL   Brain Natriuretic Peptide   Result Value Ref Range    Pro- 0 - 449 pg/mL   EKG 12 Lead   Result Value Ref Range    Ventricular Rate 52 BPM    Atrial Rate 52 BPM    P-R Interval 188 ms    QRS Duration 100 ms    Q-T Interval 436 ms    QTc Calculation (Bazett) 405 ms    P Axis 71 degrees    R Axis 9 degrees    T Axis 67 degrees    Diagnosis       Sinus bradycardiaNonspecific ST and T wave abnormalityAbnormal ECGWhen compared with ECG of6.21.21 ST changes have improved. Confirmed by Horacio Alfaro MD, [address] (1986) on 8/19/2021 4:49:03 PM         All other labs were within normal range or not returned as of this dictation. EKG: All EKG's are interpreted by the Emergency Department Physician in the absence of a cardiologist.  Please see their note for interpretation of EKG.       RADIOLOGY:   Non-plain film images such as CT, Ultrasound and MRI are read by the radiologist. Plain radiographic images are visualized andpreliminarily interpreted by the  ED Provider with the below findings:        Interpretation [facility] Radiologist below, if available at the time of this note:    XR CHEST PORTABLE   Final Result   Stable chronic changes with no acute abnormality seen. XR CHEST PORTABLE    Result Date: 8/19/2021  EXAMINATION: ONE XRAY VIEW OF THE CHEST 8/19/2021 3:50 pm COMPARISON: 06/21/2021 HISTORY: ORDERING SYSTEM PROVIDED HISTORY: shortness of breath TECHNOLOGIST PROVIDED HISTORY: Reason for exam:->shortness of breath Reason for Exam: shortness of breath FINDINGS: The heart is normal.  The pulmonary vessels are normal.  The lungs are mildly hyperinflated. No consolidation or effusion is seen. The bones are intact. Stable chronic changes with no acute abnormality seen. PROCEDURES   Unless otherwise noted below, none     Procedures    CRITICAL CARE TIME   Critical care provided for this patient of which 0 min were spend on critical care and decision making. 0 min spent on procedures. There was imminent failure of an organ system which required critical intervention to prevent clinically significant progression of life threatening deterioration of the patient's condition to the point of disability or death.       CONSULTS:  None      EMERGENCY DEPARTMENT COURSE and DIFFERENTIAL DIAGNOSIS/MDM:   Vitals:    Vitals:    08/19/21 1503 08/19/21 1627 08/19/21 1800 08/19/21 1847   BP: (!) 199/63 (!) 182/83 (!) 162/57 (!) 161/56   Pulse: 56 50 56 55   Resp: 16 16 16 17   Temp: 97.7 °F (36.5 °C)      TempSrc: Oral      SpO2: 99% 99% 98% 96%   Weight: 125 lb (56.7 kg)      Height: 5' 3\" (1.6 m)          Patient was given thefollowing medications:  Medications   aspirin chewable tablet 81 mg (has no administration in time range)   atorvastatin (LIPITOR) tablet 10 mg (has no administration in time range)   digoxin (LANOXIN) tablet 125 mcg (has no administration in time range) amLODIPine (NORVASC) tablet 5 mg (has no administration in time range)   carvedilol (COREG) tablet 12.5 mg (has no administration in time range)   0.9 % sodium chloride infusion (has no administration in time range)   sodium chloride flush 0.9 % injection 5-40 mL (has no administration in time range)   sodium chloride flush 0.9 % injection 5-40 mL (has no administration in time range)   0.9 % sodium chloride infusion (has no administration in time range)   potassium chloride (KLOR-CON M) extended release tablet 40 mEq (has no administration in time range)     Or   potassium bicarb-citric acid (EFFER-K) effervescent tablet 40 mEq (has no administration in time range)     Or   potassium chloride 10 mEq/100 mL IVPB (Peripheral Line) (has no administration in time range)   magnesium sulfate 1000 mg in dextrose 5% 100 mL IVPB (has no administration in time range)   enoxaparin (LOVENOX) injection 40 mg (has no administration in time range)   ondansetron (ZOFRAN-ODT) disintegrating tablet 4 mg (has no administration in time range)     Or   ondansetron (ZOFRAN) injection 4 mg (has no administration in time range)   polyethylene glycol (GLYCOLAX) packet 17 g (has no administration in time range)   acetaminophen (TYLENOL) tablet 650 mg (has no administration in time range)     Or   acetaminophen (TYLENOL) suppository 650 mg (has no administration in time range)   famotidine (PEPCID) injection 10 mg (has no administration in time range)   methylPREDNISolone sodium (SOLU-MEDROL) injection 40 mg (has no administration in time range)   diphenhydrAMINE (BENADRYL) injection 25 mg (has no administration in time range)   diphenhydrAMINE (BENADRYL) tablet 25 mg (has no administration in time range)   dexamethasone (PF) (DECADRON) injection 10 mg (10 mg Intravenous Given 8/19/21 1537)   famotidine (PEPCID) injection 20 mg (20 mg Intravenous Given 8/19/21 1535)   diphenhydrAMINE (BENADRYL) injection 25 mg (25 mg Intravenous Given 8/19/21 1536)         ED course  Patient presented to the ER for evaluation of upper lip swelling symptoms started this morning when he woke up. No known exposures. He takes lisinopril took his last dose at 03 30 this morning. Patient states also when he got up and a sore throat and felt like a little bit difficulty swallowing he felt a little short of breath. History of congestive heart failure. Denies any chest pain. Oropharynx is clear. Lungs are clear to auscultation bilaterally on exam here. Even respirations. No wheezes rales or rhonchi. No obvious lower extremity edema on exam.  Patient was given Decadron, Pepcid and Benadryl here to cover for possible allergic type reaction but I did discuss with him that if the swelling is due to taking his lisinopril would expect symptoms to resolve after medication gets out of his system they understand. Chest x-ray stable, no acute CHF, , troponin less than 0.01. Patient was reevaluated symptoms stable. Plan for admission for observation. FINAL IMPRESSION      1. Angioedema, initial encounter    2. Lip swelling          DISPOSITION/PLAN   DISPOSITION     PATIENT REFERREDTO:  No follow-up provider specified.     DISCHARGE MEDICATIONS:  New Prescriptions    No medications on file       DISCONTINUED MEDICATIONS:  Discontinued Medications    No medications on file              (Please note that portions ofthis note were completed with a voice recognition program.  Efforts were made to edit the dictations but occasionally words are mis-transcribed.)    Gerardo Haines PA-C (electronically signed)            Elizabet Styles PA-C  08/19/21 2010

## 2021-08-19 NOTE — PLAN OF CARE
Angioedema of lips. 70-year-old male presenting with upper lip swelling, some difficulty swallowing or shortness of breath, scratchy sensation of throat. On lisinopril. Decadron Pepcid and Benadryl given in the ED. O2 sat stable on room air. Admit for observation, stop ACE inhibitors-I have added it as an allergy.   IV steroids, Pepcid, Benadryl, IV fluids

## 2021-08-19 NOTE — ED NOTES
Labs redrawn and sent at this time. Pt sitting up in bed, daughter at bedside. Lip swelling has not improved at this time.       Naveed Up RN  08/19/21 7980

## 2021-08-20 VITALS
BODY MASS INDEX: 23.99 KG/M2 | WEIGHT: 135.4 LBS | SYSTOLIC BLOOD PRESSURE: 162 MMHG | OXYGEN SATURATION: 98 % | HEART RATE: 71 BPM | TEMPERATURE: 96.7 F | RESPIRATION RATE: 16 BRPM | DIASTOLIC BLOOD PRESSURE: 78 MMHG | HEIGHT: 63 IN

## 2021-08-20 LAB
A/G RATIO: 1.1 (ref 1.1–2.2)
ALBUMIN SERPL-MCNC: 3.9 G/DL (ref 3.4–5)
ALP BLD-CCNC: 84 U/L (ref 40–129)
ALT SERPL-CCNC: 13 U/L (ref 10–40)
ANION GAP SERPL CALCULATED.3IONS-SCNC: 9 MMOL/L (ref 3–16)
AST SERPL-CCNC: 17 U/L (ref 15–37)
BASOPHILS ABSOLUTE: 0 K/UL (ref 0–0.2)
BASOPHILS RELATIVE PERCENT: 0.4 %
BILIRUB SERPL-MCNC: 0.9 MG/DL (ref 0–1)
BUN BLDV-MCNC: 21 MG/DL (ref 7–20)
CALCIUM SERPL-MCNC: 9 MG/DL (ref 8.3–10.6)
CHLORIDE BLD-SCNC: 104 MMOL/L (ref 99–110)
CO2: 22 MMOL/L (ref 21–32)
CREAT SERPL-MCNC: 0.9 MG/DL (ref 0.8–1.3)
EOSINOPHILS ABSOLUTE: 0 K/UL (ref 0–0.6)
EOSINOPHILS RELATIVE PERCENT: 0 %
GFR AFRICAN AMERICAN: >60
GFR NON-AFRICAN AMERICAN: >60
GLOBULIN: 3.5 G/DL
GLUCOSE BLD-MCNC: 136 MG/DL (ref 70–99)
HCT VFR BLD CALC: 41.5 % (ref 40.5–52.5)
HEMOGLOBIN: 14.1 G/DL (ref 13.5–17.5)
LYMPHOCYTES ABSOLUTE: 0.8 K/UL (ref 1–5.1)
LYMPHOCYTES RELATIVE PERCENT: 16.7 %
MCH RBC QN AUTO: 31.2 PG (ref 26–34)
MCHC RBC AUTO-ENTMCNC: 34 G/DL (ref 31–36)
MCV RBC AUTO: 91.8 FL (ref 80–100)
MONOCYTES ABSOLUTE: 0 K/UL (ref 0–1.3)
MONOCYTES RELATIVE PERCENT: 0.9 %
NEUTROPHILS ABSOLUTE: 4.1 K/UL (ref 1.7–7.7)
NEUTROPHILS RELATIVE PERCENT: 82 %
PDW BLD-RTO: 13.6 % (ref 12.4–15.4)
PLATELET # BLD: 213 K/UL (ref 135–450)
PMV BLD AUTO: 8.3 FL (ref 5–10.5)
POTASSIUM REFLEX MAGNESIUM: 4.1 MMOL/L (ref 3.5–5.1)
RBC # BLD: 4.52 M/UL (ref 4.2–5.9)
SODIUM BLD-SCNC: 135 MMOL/L (ref 136–145)
TOTAL PROTEIN: 7.4 G/DL (ref 6.4–8.2)
WBC # BLD: 5.1 K/UL (ref 4–11)

## 2021-08-20 PROCEDURE — 99217 PR OBSERVATION CARE DISCHARGE MANAGEMENT: CPT | Performed by: INTERNAL MEDICINE

## 2021-08-20 PROCEDURE — G0378 HOSPITAL OBSERVATION PER HR: HCPCS

## 2021-08-20 PROCEDURE — 2500000003 HC RX 250 WO HCPCS: Performed by: INTERNAL MEDICINE

## 2021-08-20 PROCEDURE — 96372 THER/PROPH/DIAG INJ SC/IM: CPT

## 2021-08-20 PROCEDURE — 96376 TX/PRO/DX INJ SAME DRUG ADON: CPT

## 2021-08-20 PROCEDURE — 6360000002 HC RX W HCPCS: Performed by: INTERNAL MEDICINE

## 2021-08-20 PROCEDURE — 2580000003 HC RX 258: Performed by: INTERNAL MEDICINE

## 2021-08-20 PROCEDURE — 80053 COMPREHEN METABOLIC PANEL: CPT

## 2021-08-20 PROCEDURE — 6370000000 HC RX 637 (ALT 250 FOR IP): Performed by: INTERNAL MEDICINE

## 2021-08-20 PROCEDURE — 85025 COMPLETE CBC W/AUTO DIFF WBC: CPT

## 2021-08-20 RX ORDER — DIPHENHYDRAMINE HCL 25 MG
25 TABLET ORAL EVERY 8 HOURS
Qty: 9 TABLET | Refills: 0 | Status: SHIPPED | OUTPATIENT
Start: 2021-08-20 | End: 2021-08-23

## 2021-08-20 RX ORDER — FAMOTIDINE 20 MG/1
20 TABLET, FILM COATED ORAL 2 TIMES DAILY
Qty: 10 TABLET | Refills: 3 | Status: SHIPPED | OUTPATIENT
Start: 2021-08-20 | End: 2021-09-21

## 2021-08-20 RX ORDER — PREDNISONE 20 MG/1
TABLET ORAL
Qty: 9 TABLET | Refills: 0 | Status: SHIPPED | OUTPATIENT
Start: 2021-08-20 | End: 2021-09-21

## 2021-08-20 RX ADMIN — Medication 10 ML: at 12:29

## 2021-08-20 RX ADMIN — Medication 10 ML: at 09:05

## 2021-08-20 RX ADMIN — DIPHENHYDRAMINE HCL 25 MG: 25 TABLET ORAL at 05:51

## 2021-08-20 RX ADMIN — DIGOXIN 125 MCG: 125 TABLET ORAL at 09:05

## 2021-08-20 RX ADMIN — AMLODIPINE BESYLATE 5 MG: 5 TABLET ORAL at 09:05

## 2021-08-20 RX ADMIN — ENOXAPARIN SODIUM 40 MG: 40 INJECTION SUBCUTANEOUS at 09:06

## 2021-08-20 RX ADMIN — FAMOTIDINE 10 MG: 10 INJECTION, SOLUTION INTRAVENOUS at 09:05

## 2021-08-20 RX ADMIN — DIPHENHYDRAMINE HCL 25 MG: 25 TABLET ORAL at 12:28

## 2021-08-20 RX ADMIN — SODIUM CHLORIDE: 9 INJECTION, SOLUTION INTRAVENOUS at 00:13

## 2021-08-20 RX ADMIN — FAMOTIDINE 10 MG: 10 INJECTION, SOLUTION INTRAVENOUS at 00:54

## 2021-08-20 RX ADMIN — METHYLPREDNISOLONE SODIUM SUCCINATE 40 MG: 40 INJECTION, POWDER, FOR SOLUTION INTRAMUSCULAR; INTRAVENOUS at 05:50

## 2021-08-20 RX ADMIN — CARVEDILOL 12.5 MG: 6.25 TABLET, FILM COATED ORAL at 09:04

## 2021-08-20 RX ADMIN — METHYLPREDNISOLONE SODIUM SUCCINATE 40 MG: 40 INJECTION, POWDER, FOR SOLUTION INTRAMUSCULAR; INTRAVENOUS at 12:28

## 2021-08-20 RX ADMIN — DIPHENHYDRAMINE HCL 25 MG: 25 TABLET ORAL at 00:53

## 2021-08-20 NOTE — H&P
Hospital Medicine History & Physical      PCP: Tammy Barrett MD    Date of Admission: 8/19/2021    Date of Service: Pt seen/examined on 08/19/21 and placed in Observation. Chief Complaint:  Lip swelling       History Of Present Illness:  Sander Krishnan is 80 y.o. male who presented with complaint of lip swelling. Symptom onset was acute for a time period of 1 day. The severity is described as severe. The course of his symptoms over time is constant. The symptoms improved with none and worsened with none. The patient's symptom is associated with sore throat. Sander Krishnan is 80 y.o. male with history of chronic systolic CHF, HTN and HLP   He now presents with complaint of lip swollen which involve the upper lip only  This started around 1:30 PM today  He felt sore throat and heaviness around his mouth but denies any change in his voice  Over the past several hours swelling has not worsened and has stayed about the same  He was able to tolerate Jell-O in the ED without any problem  He denies any recent new medication including over-the-counter  He has been on lisinopril for the past 4 to 5 years and he never had any lip swelling  Daughter is at the bedside who assisted with history      In the ED, he was given IV steroids, Pepcid and Benadryl      Past Medical History:          Diagnosis Date    Chronic systolic CHF (congestive heart failure) ([address]     Heart disease     Hyperlipidemia     Hypertension        Past Surgical History:          Procedure Laterality Date    CATARACT REMOVAL WITH IMPLANT Left 3/16/16    EYE SURGERY Right 1/21/16    Cataract removal with implant Dr. Jim Marie         Medications Prior to Admission:      Prior to Admission medications    Medication Sig Start Date End Date Taking?  Authorizing Provider   atorvastatin (LIPITOR) 10 MG tablet Take 1 tablet by mouth daily 10/12/20  Yes Betty Collins MD   lisinopril (PRINIVIL;ZESTRIL) 20 MG tablet Take 1 tablet by mouth daily 10/12/20  Yes Antonieta Cervantes MD   digoxin [facility]) 125 MCG tablet Take 1 tablet by mouth daily 10/12/20  Yes Antonieta Cervantes MD   amLODIPine [facility]) 5 MG tablet Take 1 tablet by mouth daily 10/12/20  Yes Antonieta Cervantes MD   carvedilol (COREG) 12.5 MG tablet Take 1 tablet by mouth 2 times daily 10/12/20  Yes Antonieta Cervantes MD   aspirin 81 MG chewable tablet Take 1 tablet by mouth daily. Patient taking differently: Take 81 mg by mouth every other day  1/9/15  Yes Gricelda Cope MD       Allergies:  Ace inhibitors    Social History:      The patient currently lives at home    TOBACCO:   reports that he has quit smoking. He has never used smokeless tobacco.  ETOH:   reports no history of alcohol use. E-Cigarettes/Vaping Use     Questions Responses    E-Cigarette/Vaping Use Never User    Start Date     Passive Exposure     Quit Date     Counseling Given     Comments             Family History:      Reviewed in detail and negative for DM, CAD, Cancer, CVA. Positive as follows:        Problem Relation Age of Onset    Heart Disease Mother     High Blood Pressure Mother     Heart Disease Father     High Blood Pressure Father     Diabetes Brother     Heart Disease Brother     Stroke Brother        REVIEW OF SYSTEMS COMPLETED:   Pertinent positives as noted in the HPI. All other systems reviewed and negative. PHYSICAL EXAM PERFORMED:    BP (!) 158/75   Pulse 59   Temp 97.7 °F (36.5 °C) (Oral)   Resp 17   Ht 5' 3\" (1.6 m)   Wt 125 lb (56.7 kg)   SpO2 96%   BMI 22.14 kg/m²     General appearance:  No apparent distress, appears stated age and cooperative. HEENT:  Normal cephalic, atraumatic without obvious deformity. Pupils equal, round, and reactive to light. Extra ocular muscles intact. Conjunctivae/corneas clear. Neck: Supple, with full range of motion. No jugular venous distention. Trachea midline. Respiratory:  Normal respiratory effort.  Clear to auscultation, bilaterally without Rales/Wheezes/Rhonchi. Cardiovascular:  Regular rate and rhythm with normal S1/S2 without murmurs, rubs or gallops. Abdomen: Soft, non-tender, non-distended with normal bowel sounds. Musculoskeletal:  No clubbing, cyanosis or edema bilaterally. Full range of motion without deformity. Skin: Skin color, texture, turgor normal.  No rashes or lesions. Upper lip swollen  Neurologic:  Neurovascularly intact without any focal sensory/motor deficits. Cranial nerves: II-XII intact, grossly non-focal.  Psychiatric:  Alert and oriented, thought content appropriate, normal insight  Capillary Refill: Brisk,3 seconds, normal  Peripheral Pulses: +2 palpable, equal bilaterally       Labs:     Recent Labs     08/19/21  1517   WBC 6.2   HGB 14.4   HCT 42.5        Recent Labs     08/19/21  1645      K 4.0      CO2 21   BUN 21*   CREATININE 0.9   CALCIUM 8.8     Recent Labs     08/19/21  1645   AST 16   ALT 11   BILITOT 1.2*   ALKPHOS 86     No results for input(s): INR in the last 72 hours. Recent Labs     08/19/21  1645   TROPONINI <0.01       Urinalysis:      Lab Results   Component Value Date    NITRU Negative 03/23/2019    BLOODU Negative 03/23/2019    SPECGRAV <=1.005 03/23/2019    GLUCOSEU Negative 03/23/2019       Radiology:     CXR: I have reviewed the CXR with the following interpretation: No acute cardiopulmonary disease    EKG:  I have reviewed the EKG with the following interpretation: Normal sinus rhythm, no evidence of acute ischemia      ASSESSMENT:      1. Angioedema of lips  2. Chronic systolic congestive heart failure, stable -  EF 26% per echo dated January 7, 2015   3. Hypertension  4. Hyperlipidemia      PLAN:    1. Admit to [facility]  2. Continue Benadryl, IV Pepcid and IV Solu-Medrol  3. List lisinopril (ACE inhibitor's) as an allergy  4. May need to titrate up his other BP meds including amlodipine and Coreg now that he is off of lisinopril  5.  IV normal saline only at 50 mL/h for hydration as patient has known history of CHF with EF 25%  6. Consult speech for swallow evaluation  7. Clear liquid diet for now, advance as tolerated in the morning    DVT Prophylaxis: Lovenox  Diet: ADULT DIET; Clear Liquid  Code Status: Full Code    PT/OT Eval Status: PT/OT is ordered to assist with discharge [address] as observation       Anastacio Nelson MD    Thank you Gabriela Banegas MD for the opportunity to be involved in this patient's care. If you have any questions or concerns please feel free to contact me at [phone].

## 2021-08-20 NOTE — PROGRESS NOTES
Speech Language Pathology  Order received and chart reviewed. Spoke with RN, Anmol Skaggs, who reports pt is to be leaving soon and does not present with sx's of dysphagia. Stated she would get order d/c'ed at this time. No charges filed. Please re-consult or call SLP if problems arise.  Thank you,    Judith Patino M.A., [facility]  Speech-Language Pathologist  Phone: [phone]

## 2021-08-20 NOTE — ACP (ADVANCE CARE PLANNING)
Advance Care Planning   Healthcare Decision Maker:  Rubi Stringer, dtr: [phone]  Chen Alicea, son: [phone]      Click here to complete Healthcare Decision Makers including selection of the Healthcare Decision Maker Relationship (ie \"Primary\").

## 2021-08-20 NOTE — ED NOTES
Pt placed in floor bed and yellow hospital socks. Pt also given hospital pants for comfort.        Poncho Villalba RN  08/20/21 9372

## 2021-08-20 NOTE — CARE COORDINATION
Case Management Assessment  Initial Evaluation      Patient Name: Joseph Lucero  YOB: 1934  Diagnosis: Lip swelling [R22.0]  Angioedema of lips, initial encounter Jacki Slot. 3XXA]  Angioedema, initial encounter [T78. 3XXA]  Date / Time: 8/19/2021  2:54 PM    Admission status/Date:OBSERVATION  Chart Reviewed: Yes      Patient Interviewed: No   Family Interviewed:  Yes - sonAmalia      Hospitalization in the last 30 days:  No      Health Care Decision Maker :   Frantz Nunez, dtr: [phone]  Amalia Gann, son: [phone]    (CM - must 1st enter selection under Navigator - emergency contact- Health Care Decision Maker Relationship and pick relationship)   Who do you trust or have selected to make healthcare decisions for you      Met with: pt son, Gisela Segura, via telephone  Interview conducted  (bedside/phone):    Current PCP: [address] required for SNF : Y, N          3 night stay required - Y, [employer]  Support Systems/Care Needs:    Transportation: self    Meal Preparation: self    Housing  Living Arrangements: home alone. IPTA.  Son lives nearby  Steps: ramp  Intent for return to present living arrangements: Yes  Identified Issues: -    [address] with [address] : No Agency:(Services)     Passport/Waiver : No  :                      Phone Number:    Passport/Waiver Services: -          Durable Medical Equiptment   DME Provider: n/a  Equipment:   Walker___Cane_X__RTS___ BSC___Shower Chair___Hospital Bed___W/C____Other________  02 at ____Liter(s)---wears(frequency)_______ HHN ___ CPAP___ BiPap___   N/A____      Home O2 Use :  No    If No for home O2---if presently on O2 during hospitalization:  No  if yes CM to follow for potential DC O2 need  Informed of need for care provider to bring portable home O2 tank on day of discharge for nursing to connect prior to leaving:   Not Indicated  Verbalized agreement/Understanding: Not Indicated    Community Service Affiliation  Dialysis:  No    · Agency:  · Location:  · Dialysis Schedule:  · Phone:   · Fax: Other Community Services: (ex:PT/OT,Mental Health,Wound Clinic, Cardio/Pul [address])    DISCHARGE PLAN: Explained Case Management role/services. Reviewed chart and spoke with pt son, Gisela Segura, who states that pt is IPTA and lives at home alone. Gisela Segura states that he lives nearby and assists pt as needed. Plan is for pt to return home at discharge. Denies need for hhc or other services. Will follow.

## 2021-08-20 NOTE — PROGRESS NOTES
Prescriptions sent by escribed to patient's pharmacy and discharge instructions given. Pt verbalized understanding and denies any questions/ needs at this time. RN took patient to sons car in wheelchair. No s/s distress upon discharge.

## 2021-08-20 NOTE — DISCHARGE SUMMARY
Name:  Bernadine De Paz  Room:  /4391-62  MRN:    [id number]    Discharge Summary      This discharge summary is in conjunction with a complete physical exam done on the day of discharge. Attending Physician: Dr. Loaiza Floor  Discharging Physician: Dr. Underwood Jennifer: 8/19/2021  Discharge:   8/20/2021    HPI:  Garret Ornelas is 80 y.o. male who presented with complaint of lip swelling. Symptom onset was acute for a time period of 1 day. The severity is described as severe. The course of his symptoms over time is constant. The symptoms improved with none and worsened with none. The patient's symptom is associated with sore throat.     Garret Ornelas is 80 y.o. male with history of chronic systolic CHF, HTN and HLP   He now presents with complaint of lip swollen which involve the upper lip only  This started around 1:30 PM today  He felt sore throat and heaviness around his mouth but denies any change in his voice  Over the past several hours swelling has not worsened and has stayed about the same  He was able to tolerate Jell-O in the ED without any problem  He denies any recent new medication including over-the-counter  He has been on lisinopril for the past 4 to 5 years and he never had any lip swelling  Daughter is at the bedside who assisted with history        In the ED, he was given IV steroids, Pepcid and Benadryl    Diagnoses this Admission and Hospital Course   Angioedema  - due to Lisinopril. This was discontinued and listed as an allergy  - Treated with Benadryl, pepcid, Solu-medrol  Patient is improved. No swelling of tongue, uvula, oropharynx  - D/c on prednisone taper, benadryl, pepcid  Stopped Lisinopril. Chronic combined CHF  - recovered EF. Last Echo EF 60% in 2015.  - no s/s decompensation  - continue Digoxin, Coreg. Stopped Lisinopril. Hypertension  - BP elevated. Stopped Lisinopril  - continued Coreg, Amlodipine. Hyperlipidemia  - on Atorvastatin.      DVT Prophylaxis: Lovenox    Procedures (Please Review Full Report for Details)  N/A    Consults    N/A      Physical Exam at Discharge:    BP (!) 162/78   Pulse 71   Temp 96.7 °F (35.9 °C) (Oral)   Resp 16   Ht 5' 3\" (1.6 m)   Wt 135 lb 6.4 oz (61.4 kg)   SpO2 98%   BMI 23.99 kg/m²     Gen: No distress. Alert. Eyes: PERRL. No sclera icterus. No conjunctival injection. ENT: No discharge. Pharynx clear. No swelling of tongue, uvula, oropharynx. Neck: No JVD. No Carotid Bruit. Trachea midline. Resp: No accessory muscle use. No crackles. No wheezes. No rhonchi. CV: Regular rate. Regular rhythm. No murmur. No rub. No edema. Capillary Refill: Brisk,< 3 seconds   Peripheral Pulses: +2 palpable, equal bilaterally   GI: Non-tender. Non-distended. No masses. No organomegaly. Normal bowel sounds. No hernia. Skin: Warm and dry. No nodule on exposed extremities. No rash on exposed extremities. M/S: No cyanosis. No joint deformity. No clubbing. Neuro: Awake. Grossly nonfocal    Psych: Oriented x 3. No anxiety or agitation    CBC: Recent Labs     08/19/21  1517 08/20/21  0553   WBC 6.2 5.1   HGB 14.4 14.1   HCT 42.5 41.5   MCV 93.4 91.8    213     BMP:   Recent Labs     08/19/21  1645 08/20/21  0553    135*   K 4.0 4.1    104   CO2 21 22   BUN 21* 21*   CREATININE 0.9 0.9     LIVER PROFILE:   Recent Labs     08/19/21  1645 08/20/21  0553   AST 16 17   ALT 11 13   BILITOT 1.2* 0.9   ALKPHOS 86 84       CARDIAC ENZYMES  Recent Labs     08/19/21  1645   TROPONINI <0.01       CULTURES  N/A    RADIOLOGY  XR CHEST PORTABLE   Final Result   Stable chronic changes with no acute abnormality seen. Echo 7/21/2015   Summary   This is a limited study for CHF, cardiomyopathy and mitral regurgitation. Ejection fraction calculated by Monge''s method is 62 %. LV systolic function is normal with ejection fraction estimated to be 60%. No regional wall motion abnormalities are noted.    Mild mitral regurgitation is present. There is mild concentric left ventricular hypertrophy. There is reversal of E/A inflow velocities across the mitral valve   suggesting type I diastolic dysfunction. Mild aortic regurgitation is present. Discharge Medications     Medication List      START taking these medications    diphenhydrAMINE 25 MG tablet  Commonly known as: Benadryl Allergy  Take 1 tablet by mouth every 8 hours for 3 days     famotidine 20 MG tablet  Commonly known as: PEPCID  Take 1 tablet by mouth 2 times daily for 5 days     predniSONE 20 MG tablet  Commonly known as: DELTASONE  2 qd- 3 days, 1 1/2 qd- 2 days,1 qd- 2 days,1/2 qd- 2 days        CHANGE how you take these medications    aspirin 81 MG chewable tablet  Take 1 tablet by mouth daily. What changed: when to take this        CONTINUE taking these medications    amLODIPine 5 MG tablet  Commonly known as: NORVASC  Take 1 tablet by mouth daily     atorvastatin 10 MG tablet  Commonly known as: LIPITOR  Take 1 tablet by mouth daily     carvedilol 12.5 MG tablet  Commonly known as: COREG  Take 1 tablet by mouth 2 times daily     digoxin 125 MCG tablet  Commonly known as: LANOXIN  Take 1 tablet by mouth daily        STOP taking these medications    lisinopril 20 MG tablet  Commonly known as: PRINIVIL;ZESTRIL           Where to Get Your Medications      These medications were sent to [address] [place] [address], [address] [address]    Phone: [phone]   · diphenhydrAMINE 25 MG tablet  · famotidine 20 MG tablet  · predniSONE 20 MG tablet           Discharged in stable condition to home. Follow Up: Follow up with PCP. BRIDGET Hernandez.

## 2021-08-23 ENCOUNTER — TELEPHONE (OUTPATIENT)
Dept: INTERNAL MEDICINE CLINIC | Age: 86
End: 2021-08-23

## 2021-08-23 NOTE — TELEPHONE ENCOUNTER
[facility] Initial Follow Up Call    Outreach made within 2 business days of discharge: Yes    Patient: Amy Reece Patient : 1934   MRN: [id number]  Reason for Admission: There are no discharge diagnoses documented for the most recent discharge. Discharge Date: 21       Spoke with: Maria Antonia Casillas    Discharge department/facility: [facility] Patient Contact:  Was patient able to fill all prescriptions: Yes  Was patient instructed to bring all medications to the follow-up visit: Yes  Is patient taking all medications as directed in the discharge summary?  Yes  Does patient understand their discharge instructions: Yes  Does patient have questions or concerns that need addressed prior to 7-14 day follow up office visit: no    Scheduled appointment with PCP within 7-14 days    Follow Up  Future Appointments   Date Time Provider Katherine Ray   2021  2:20 PM MD Nathan Purdy Int None   2021  1:15 PM Khushboo Moser MD [facility] CHICHI Pandey [address]

## 2021-09-21 ENCOUNTER — OFFICE VISIT (OUTPATIENT)
Dept: INTERNAL MEDICINE CLINIC | Age: 86
End: 2021-09-21

## 2021-09-21 VITALS
HEIGHT: 63 IN | DIASTOLIC BLOOD PRESSURE: 80 MMHG | BODY MASS INDEX: 25.16 KG/M2 | RESPIRATION RATE: 12 BRPM | WEIGHT: 142 LBS | SYSTOLIC BLOOD PRESSURE: 144 MMHG | HEART RATE: 70 BPM

## 2021-09-21 DIAGNOSIS — I11.0 HYPERTENSIVE HEART DISEASE WITH CHRONIC SYSTOLIC CONGESTIVE HEART FAILURE (HCC): ICD-10-CM

## 2021-09-21 DIAGNOSIS — I34.0 NONRHEUMATIC MITRAL VALVE REGURGITATION: ICD-10-CM

## 2021-09-21 DIAGNOSIS — I25.5 ISCHEMIC CARDIOMYOPATHY: ICD-10-CM

## 2021-09-21 DIAGNOSIS — I50.22 HYPERTENSIVE HEART DISEASE WITH CHRONIC SYSTOLIC CONGESTIVE HEART FAILURE (HCC): ICD-10-CM

## 2021-09-21 DIAGNOSIS — E78.2 MIXED HYPERLIPIDEMIA: ICD-10-CM

## 2021-09-21 DIAGNOSIS — T78.3XXS ANGIOEDEMA, SEQUELA: ICD-10-CM

## 2021-09-21 DIAGNOSIS — I10 ESSENTIAL HYPERTENSION: Primary | ICD-10-CM

## 2021-09-21 DIAGNOSIS — I50.22 CHRONIC SYSTOLIC CONGESTIVE HEART FAILURE (HCC): ICD-10-CM

## 2021-09-21 PROBLEM — J18.9 PNEUMONIA DUE TO ORGANISM: Status: RESOLVED | Noted: 2019-03-23 | Resolved: 2021-09-21

## 2021-09-21 PROCEDURE — 99214 OFFICE O/P EST MOD 30 MIN: CPT | Performed by: INTERNAL MEDICINE

## 2021-09-21 ASSESSMENT — ENCOUNTER SYMPTOMS
ABDOMINAL PAIN: 0
RHINORRHEA: 0
NAUSEA: 0
VOMITING: 0
BACK PAIN: 0
SHORTNESS OF BREATH: 0
WHEEZING: 0

## 2021-09-21 ASSESSMENT — PATIENT HEALTH QUESTIONNAIRE - PHQ9
SUM OF ALL RESPONSES TO PHQ QUESTIONS 1-9: 0
SUM OF ALL RESPONSES TO PHQ QUESTIONS 1-9: 0
1. LITTLE INTEREST OR PLEASURE IN DOING THINGS: 0
2. FEELING DOWN, DEPRESSED OR HOPELESS: 0
SUM OF ALL RESPONSES TO PHQ QUESTIONS 1-9: 0
SUM OF ALL RESPONSES TO PHQ9 QUESTIONS 1 & 2: 0

## 2021-09-21 NOTE — PROGRESS NOTES
Subjective:      Patient ID: Jay Conway is a 80 y.o. male. HPI  Patient is here for follow up. He was hospitalized for angioedema and was taken off Lisinopril. No recurrence. He has hypertension. It is under good control at home. It is high in the office. He says he will not take any more BP medication and does not care if his \"BP hig in the office  He has chronic systolic CHF. No orthopnea or PND attacks. He has mild pedal edema. He is off Lasix and Aldactone. He has seen cardiology. No visits to the Er. He denies any chest pain. No dyspnea. He has cardiomyopathy causing above problems. He has mitral regurgitation/aortic regurgitation. It is stable. Review of Systems   Constitutional: Negative for activity change and appetite change. HENT: Negative for postnasal drip and rhinorrhea. Respiratory: Negative for shortness of breath and wheezing. Cardiovascular: Negative for chest pain, palpitations and leg swelling. Gastrointestinal: Negative for abdominal pain, nausea and vomiting. Genitourinary: Negative for difficulty urinating and frequency. Musculoskeletal: Negative for back pain and joint swelling. Skin: Negative for rash. Neurological: Negative for light-headedness. Psychiatric/Behavioral: Negative for sleep disturbance.      Past Medical History:   Diagnosis Date    Chronic systolic CHF (congestive heart failure) (HCC)     Heart disease     Hyperlipidemia     Hypertension      Past Surgical History:   Procedure Laterality Date    CATARACT REMOVAL WITH IMPLANT Left 3/16/16    EYE SURGERY Right 1/21/16    Cataract removal with implant Dr. Hayder Cade History   Problem Relation Age of Onset    Heart Disease Mother     High Blood Pressure Mother     Heart Disease Father     High Blood Pressure Father     Diabetes Brother     Heart Disease Brother     Stroke Brother      Social History     Tobacco Use    Smoking status: Former Smoker  Smokeless tobacco: Never Used   Vaping Use    Vaping Use: Never used   Substance Use Topics    Alcohol use: No    Drug use: No     Current Outpatient Medications   Medication Sig Dispense Refill    atorvastatin (LIPITOR) 10 MG tablet Take 1 tablet by mouth daily 90 tablet 3    digoxin (LANOXIN) 125 MCG tablet Take 1 tablet by mouth daily 90 tablet 3    amLODIPine (NORVASC) 5 MG tablet Take 1 tablet by mouth daily 90 tablet 3    carvedilol (COREG) 12.5 MG tablet Take 1 tablet by mouth 2 times daily 180 tablet 3    aspirin 81 MG chewable tablet Take 1 tablet by mouth daily. (Patient taking differently: Take 81 mg by mouth every other day ) 30 tablet 0     No current facility-administered medications for this visit. BP (!) 144/80 (Site: Left Upper Arm, Position: Sitting, Cuff Size: Medium Adult)   Pulse 70   Resp 12   Ht 5' 3\" (1.6 m)   Wt 142 lb (64.4 kg)   BMI 25.15 kg/m²      Objective:   Physical Exam  Constitutional:       Appearance: He is well-developed. HENT:      Head: Normocephalic. Eyes:      Conjunctiva/sclera: Conjunctivae normal.      Pupils: Pupils are equal, round, and reactive to light. Neck:      Thyroid: No thyroid mass or thyromegaly. Vascular: No carotid bruit or JVD. Trachea: Trachea normal.   Cardiovascular:      Rate and Rhythm: Normal rate and regular rhythm. Heart sounds: Murmur heard. Systolic murmur is present. No gallop. Pulmonary:      Effort: Pulmonary effort is normal. No respiratory distress. Breath sounds: Normal breath sounds. No wheezing or rales. Abdominal:      General: Bowel sounds are normal. There is no distension. Palpations: Abdomen is soft. There is no hepatomegaly, splenomegaly or mass. Tenderness: There is no abdominal tenderness. Musculoskeletal:         General: Normal range of motion. Cervical back: Normal range of motion and neck supple. Lymphadenopathy:      Cervical: No cervical adenopathy. Skin:     General: Skin is warm and dry. Findings: No rash. Neurological:      Mental Status: He is alert and oriented to person, place, and time. Cranial Nerves: No cranial nerve deficit. Deep Tendon Reflexes: Reflexes are normal and symmetric. Psychiatric:         Behavior: Behavior normal.         Thought Content: Thought content normal.         Judgment: Judgment normal.           IMAGING:     CXR done 1/6/15  IMPRESSION:      1. Cardiomegaly with pulmonary vascular congestion. 2. Left basilar airspace disease which may be secondary to   pneumonia or atelectasis. Asymmetric pulmonary edema is felt be   unlikely. 3. Probable small left pleural effusion. ECHO 1/7/15  Summary  sinus rhythm is present during the test.  The ejection fraction measured by Monge's method is 26 %. Severe global hypokinesis is present. Left ventricular size is at upper limit of normal.  There is mild concentric left ventricular hypertrophy. Doppler study suggests diastolic dysfunction with impaired relaxation and  elevated filling pressure. Interventricular septum is flat consistent   with  volume and pressure overload of right ventricle. Mitral valve is structurally normal.  Mitral valve leaflets appear to open adequately. Moderate mitral regurgitation is present. ERO 0.41 with MR volume of 51ml. The left atrium is severely dilated by volume measurement. The aortic valve is structurally normal.  The aortic leaflets appear to open adequately. The aortic valve appears tricuspid. Mild aortic regurgitation is present. The aortic root is slightly enlarged at 3.8cm. Right ventricular systolic function is normal .  The right ventricle is enlarged. Tricuspid valve is structurally normal.  The tricuspid valve leaflets appear to open adequately. There is moderate tricuspid regurgitation.   Systolic pulmonary artery pressure (SPAP) is estimated at 51 mmHg   consistent  with moderate pulmonary

## 2021-11-05 NOTE — PATIENT INSTRUCTIONS
Called patient, spoke with patient's mother, New patient  Hidradenitis suppurativa Boils, been going on for a while and getting worse. On thighs, under breast, and both arms. Opened up and drained out. Patient has an appointment in Jan, would like to come in sooner if anything opens up.     of vegetable juice. Choose vegetables more often than vegetable juice. · Get 2 to 3 servings of low-fat and fat-free dairy each day. A serving is 8 ounces of milk, 1 cup of yogurt, or 1 ½ ounces of cheese. · Eat 6 to 8 servings of grains each day. A serving is 1 slice of bread, 1 ounce of dry cereal, or ½ cup of cooked rice, pasta, or cooked cereal. Try to choose whole-grain products as much as possible. · Limit lean meat, poultry, and fish to 2 servings each day. A serving is 3 ounces, about the size of a deck of cards. · Eat 4 to 5 servings of nuts, seeds, and legumes (cooked dried beans, lentils, and split peas) each week. A serving is 1/3 cup of nuts, 2 tablespoons of seeds, or ½ cup of cooked beans or peas. · Limit fats and oils to 2 to 3 servings each day. A serving is 1 teaspoon of vegetable oil or 2 tablespoons of salad dressing. · Limit sweets and added sugars to 5 servings or less a week. A serving is 1 tablespoon jelly or jam, ½ cup sorbet, or 1 cup of lemonade. · Eat less than 2,300 milligrams (mg) of sodium a day. If you limit your sodium to 1,500 mg a day, you can lower your blood pressure even more. Tips for success  · Start small. Do not try to make dramatic changes to your diet all at once. You might feel that you are missing out on your favorite foods and then be more likely to not follow the plan. Make small changes, and stick with them. Once those changes become habit, add a few more changes. · Try some of the following:  ¨ Make it a goal to eat a fruit or vegetable at every meal and at snacks. This will make it easy to get the recommended amount of fruits and vegetables each day. ¨ Try yogurt topped with fruit and nuts for a snack or healthy dessert. ¨ Add lettuce, tomato, cucumber, and onion to sandwiches. ¨ Combine a ready-made pizza crust with low-fat mozzarella cheese and lots of vegetable toppings.  Try using tomatoes, squash, spinach, broccoli, carrots, cauliflower, and onions. ¨ Have a variety of cut-up vegetables with a low-fat dip as an appetizer instead of chips and dip. ¨ Sprinkle sunflower seeds or chopped almonds over salads. Or try adding chopped walnuts or almonds to cooked vegetables. ¨ Try some vegetarian meals using beans and peas. Add garbanzo or kidney beans to salads. Make burritos and tacos with mashed love beans or black beans. Where can you learn more? Go to https://[employer]. org and sign in to your [employer] account. Enter N316 in the [employer] box to learn more about \"DASH Diet: Care Instructions. \"     If you do not have an account, please click on the \"Sign Up Now\" link. Current as of: April 3, 2017  Content Version: 11.3  © 7566-3629 [employer]. Care instructions adapted under license by [facility] ([facility]). If you have questions about a medical condition or this instruction, always ask your healthcare professional. [address] any warranty or liability for your use of this information.

## 2021-11-11 RX ORDER — DIGOXIN 125 MCG
125 TABLET ORAL DAILY
Qty: 90 TABLET | Refills: 0 | Status: SHIPPED | OUTPATIENT
Start: 2021-11-11 | End: 2022-05-10

## 2021-11-11 RX ORDER — AMLODIPINE BESYLATE 5 MG/1
5 TABLET ORAL DAILY
Qty: 90 TABLET | Refills: 0 | Status: SHIPPED | OUTPATIENT
Start: 2021-11-11 | End: 2022-05-10

## 2021-11-11 RX ORDER — ATORVASTATIN CALCIUM 10 MG/1
10 TABLET, FILM COATED ORAL DAILY
Qty: 90 TABLET | Refills: 0 | Status: SHIPPED | OUTPATIENT
Start: 2021-11-11 | End: 2022-05-10

## 2021-11-11 RX ORDER — CARVEDILOL 12.5 MG/1
12.5 TABLET ORAL 2 TIMES DAILY
Qty: 180 TABLET | Refills: 0 | Status: SHIPPED | OUTPATIENT
Start: 2021-11-11 | End: 2022-05-10

## 2022-05-10 RX ORDER — DIGOXIN 125 MCG
125 TABLET ORAL DAILY
Qty: 90 TABLET | Refills: 0 | Status: SHIPPED | OUTPATIENT
Start: 2022-05-10

## 2022-05-10 RX ORDER — CARVEDILOL 12.5 MG/1
12.5 TABLET ORAL 2 TIMES DAILY
Qty: 180 TABLET | Refills: 0 | Status: SHIPPED | OUTPATIENT
Start: 2022-05-10 | End: 2022-06-29 | Stop reason: SDUPTHER

## 2022-05-10 RX ORDER — AMLODIPINE BESYLATE 5 MG/1
5 TABLET ORAL DAILY
Qty: 90 TABLET | Refills: 0 | Status: SHIPPED | OUTPATIENT
Start: 2022-05-10 | End: 2022-06-29 | Stop reason: SDUPTHER

## 2022-05-10 RX ORDER — ATORVASTATIN CALCIUM 10 MG/1
10 TABLET, FILM COATED ORAL DAILY
Qty: 90 TABLET | Refills: 0 | Status: SHIPPED | OUTPATIENT
Start: 2022-05-10 | End: 2022-06-29 | Stop reason: SDUPTHER

## 2022-06-20 ENCOUNTER — OFFICE VISIT (OUTPATIENT)
Dept: INTERNAL MEDICINE CLINIC | Age: 87
End: 2022-06-20

## 2022-06-20 VITALS
SYSTOLIC BLOOD PRESSURE: 170 MMHG | BODY MASS INDEX: 25.87 KG/M2 | WEIGHT: 146 LBS | DIASTOLIC BLOOD PRESSURE: 80 MMHG | HEIGHT: 63 IN | HEART RATE: 70 BPM | RESPIRATION RATE: 12 BRPM

## 2022-06-20 DIAGNOSIS — I10 PRIMARY HYPERTENSION: Primary | ICD-10-CM

## 2022-06-20 DIAGNOSIS — E78.2 MIXED HYPERLIPIDEMIA: ICD-10-CM

## 2022-06-20 DIAGNOSIS — I10 PRIMARY HYPERTENSION: ICD-10-CM

## 2022-06-20 DIAGNOSIS — I11.0 HYPERTENSIVE HEART DISEASE WITH CHRONIC SYSTOLIC CONGESTIVE HEART FAILURE (HCC): ICD-10-CM

## 2022-06-20 DIAGNOSIS — I50.22 CHRONIC SYSTOLIC CONGESTIVE HEART FAILURE (HCC): ICD-10-CM

## 2022-06-20 DIAGNOSIS — I25.5 ISCHEMIC CARDIOMYOPATHY: ICD-10-CM

## 2022-06-20 DIAGNOSIS — I50.22 HYPERTENSIVE HEART DISEASE WITH CHRONIC SYSTOLIC CONGESTIVE HEART FAILURE (HCC): ICD-10-CM

## 2022-06-20 DIAGNOSIS — T78.3XXS ANGIOEDEMA, SEQUELA: ICD-10-CM

## 2022-06-20 LAB
A/G RATIO: 1.5 (ref 1.1–2.2)
ALBUMIN SERPL-MCNC: 4.2 G/DL (ref 3.4–5)
ALP BLD-CCNC: 107 U/L (ref 40–129)
ALT SERPL-CCNC: 15 U/L (ref 10–40)
ANION GAP SERPL CALCULATED.3IONS-SCNC: 13 MMOL/L (ref 3–16)
AST SERPL-CCNC: 20 U/L (ref 15–37)
BASOPHILS ABSOLUTE: 0.1 K/UL (ref 0–0.2)
BASOPHILS RELATIVE PERCENT: 0.8 %
BILIRUB SERPL-MCNC: 0.9 MG/DL (ref 0–1)
BUN BLDV-MCNC: 16 MG/DL (ref 7–20)
CALCIUM SERPL-MCNC: 9.1 MG/DL (ref 8.3–10.6)
CHLORIDE BLD-SCNC: 102 MMOL/L (ref 99–110)
CHOLESTEROL, TOTAL: 136 MG/DL (ref 0–199)
CO2: 24 MMOL/L (ref 21–32)
CREAT SERPL-MCNC: 1.1 MG/DL (ref 0.8–1.3)
EOSINOPHILS ABSOLUTE: 0.2 K/UL (ref 0–0.6)
EOSINOPHILS RELATIVE PERCENT: 2.4 %
GFR AFRICAN AMERICAN: >60
GFR NON-AFRICAN AMERICAN: >60
GLUCOSE BLD-MCNC: 107 MG/DL (ref 70–99)
HCT VFR BLD CALC: 40.6 % (ref 40.5–52.5)
HDLC SERPL-MCNC: 31 MG/DL (ref 40–60)
HEMOGLOBIN: 14 G/DL (ref 13.5–17.5)
LDL CHOLESTEROL CALCULATED: 68 MG/DL
LYMPHOCYTES ABSOLUTE: 1.7 K/UL (ref 1–5.1)
LYMPHOCYTES RELATIVE PERCENT: 24.2 %
MCH RBC QN AUTO: 31.4 PG (ref 26–34)
MCHC RBC AUTO-ENTMCNC: 34.4 G/DL (ref 31–36)
MCV RBC AUTO: 91.3 FL (ref 80–100)
MONOCYTES ABSOLUTE: 0.5 K/UL (ref 0–1.3)
MONOCYTES RELATIVE PERCENT: 7.7 %
NEUTROPHILS ABSOLUTE: 4.4 K/UL (ref 1.7–7.7)
NEUTROPHILS RELATIVE PERCENT: 64.9 %
PDW BLD-RTO: 14.4 % (ref 12.4–15.4)
PLATELET # BLD: 173 K/UL (ref 135–450)
PMV BLD AUTO: 8.3 FL (ref 5–10.5)
POTASSIUM SERPL-SCNC: 4 MMOL/L (ref 3.5–5.1)
RBC # BLD: 4.45 M/UL (ref 4.2–5.9)
SODIUM BLD-SCNC: 139 MMOL/L (ref 136–145)
TOTAL PROTEIN: 7 G/DL (ref 6.4–8.2)
TRIGL SERPL-MCNC: 187 MG/DL (ref 0–150)
TSH SERPL DL<=0.05 MIU/L-ACNC: 2.4 UIU/ML (ref 0.27–4.2)
URIC ACID, SERUM: 5.9 MG/DL (ref 3.5–7.2)
VLDLC SERPL CALC-MCNC: 37 MG/DL
WBC # BLD: 6.8 K/UL (ref 4–11)

## 2022-06-20 PROCEDURE — 99214 OFFICE O/P EST MOD 30 MIN: CPT | Performed by: INTERNAL MEDICINE

## 2022-06-20 PROCEDURE — 1123F ACP DISCUSS/DSCN MKR DOCD: CPT | Performed by: INTERNAL MEDICINE

## 2022-06-20 ASSESSMENT — ENCOUNTER SYMPTOMS
VOMITING: 0
NAUSEA: 0
ABDOMINAL PAIN: 0
SHORTNESS OF BREATH: 0
BACK PAIN: 0
WHEEZING: 0
RHINORRHEA: 0

## 2022-06-20 NOTE — PROGRESS NOTES
Subjective:      Patient ID: Елена Omer is a 80 y.o. male. HPI  Patient is here for follow up. He has hypertension. It is under good control at home. It is high in the office. He says he will not take any more BP medication and does not care if his \"BP high in the office  He has chronic systolic CHF. No orthopnea or PND attacks. He has mild pedal edema. He is off Lasix and Aldactone. He has seen cardiology. No visits to the Er. He denies any chest pain. No dyspnea. He has cardiomyopathy causing above problems. He has mitral regurgitation/aortic regurgitation. It is stable. He has history of angioedema to lisinopril. Review of Systems   Constitutional: Negative for activity change and appetite change. HENT: Negative for postnasal drip and rhinorrhea. Respiratory: Negative for shortness of breath and wheezing. Cardiovascular: Negative for chest pain, palpitations and leg swelling. Gastrointestinal: Negative for abdominal pain, nausea and vomiting. Genitourinary: Negative for difficulty urinating and frequency. Musculoskeletal: Negative for back pain and joint swelling. Skin: Negative for rash. Neurological: Negative for light-headedness. Psychiatric/Behavioral: Negative for sleep disturbance.      Past Medical History:   Diagnosis Date    Chronic systolic CHF (congestive heart failure) (HCC)     Heart disease     Hyperlipidemia     Hypertension      Past Surgical History:   Procedure Laterality Date    CATARACT REMOVAL WITH IMPLANT Left 3/16/16    EYE SURGERY Right 1/21/16    Cataract removal with implant Dr. Karthikeyan Albright History   Problem Relation Age of Onset    Heart Disease Mother     High Blood Pressure Mother     Heart Disease Father     High Blood Pressure Father     Diabetes Brother     Heart Disease Brother     Stroke Brother      Social History     Tobacco Use    Smoking status: Former Smoker    Smokeless tobacco: Never Used   Vaping Use    Vaping Use: Never used   Substance Use Topics    Alcohol use: No    Drug use: No     Current Outpatient Medications   Medication Sig Dispense Refill    atorvastatin (LIPITOR) 10 MG tablet Take 1 tablet by mouth daily 90 tablet 0    digoxin (LANOXIN) 125 MCG tablet Take 1 tablet by mouth daily 90 tablet 0    amLODIPine (NORVASC) 5 MG tablet Take 1 tablet by mouth daily 90 tablet 0    carvedilol (COREG) 12.5 MG tablet Take 1 tablet by mouth 2 times daily 180 tablet 0    aspirin 81 MG chewable tablet Take 1 tablet by mouth daily. (Patient taking differently: Take 81 mg by mouth every other day ) 30 tablet 0     No current facility-administered medications for this visit. Ht 5' 3\" (1.6 m)   Wt 146 lb (66.2 kg)   BMI 25.86 kg/m²      Vitals:    06/20/22 1006 06/20/22 1034   BP: (!) 180/80 (!) 170/80   Site: Left Upper Arm Right Upper Arm   Position: Sitting Supine   Cuff Size: Medium Adult Medium Adult   Pulse: 70    Resp: 12    Weight: 146 lb (66.2 kg)    Height: 5' 3\" (1.6 m)       Objective:   Physical Exam  Constitutional:       Appearance: He is well-developed. HENT:      Head: Normocephalic. Eyes:      Conjunctiva/sclera: Conjunctivae normal.      Pupils: Pupils are equal, round, and reactive to light. Neck:      Thyroid: No thyroid mass or thyromegaly. Vascular: No carotid bruit or JVD. Trachea: Trachea normal.   Cardiovascular:      Rate and Rhythm: Normal rate and regular rhythm. Heart sounds: Murmur heard. Systolic murmur is present. No gallop. Pulmonary:      Effort: Pulmonary effort is normal. No respiratory distress. Breath sounds: Normal breath sounds. No wheezing or rales. Abdominal:      General: Bowel sounds are normal. There is no distension. Palpations: Abdomen is soft. There is no hepatomegaly, splenomegaly or mass. Tenderness: There is no abdominal tenderness. Musculoskeletal:         General: Normal range of motion. Cervical back: Normal range of motion and neck supple. Lymphadenopathy:      Cervical: No cervical adenopathy. Skin:     General: Skin is warm and dry. Findings: No rash. Neurological:      Mental Status: He is alert and oriented to person, place, and time. Cranial Nerves: No cranial nerve deficit. Deep Tendon Reflexes: Reflexes are normal and symmetric. Psychiatric:         Behavior: Behavior normal.         Thought Content: Thought content normal.         Judgment: Judgment normal.           IMAGING:     CXR done 1/6/15  IMPRESSION:      1. Cardiomegaly with pulmonary vascular congestion. 2. Left basilar airspace disease which may be secondary to   pneumonia or atelectasis. Asymmetric pulmonary edema is felt be   unlikely. 3. Probable small left pleural effusion. ECHO 1/7/15  Summary  sinus rhythm is present during the test.  The ejection fraction measured by Monge's method is 26 %. Severe global hypokinesis is present. Left ventricular size is at upper limit of normal.  There is mild concentric left ventricular hypertrophy. Doppler study suggests diastolic dysfunction with impaired relaxation and  elevated filling pressure. Interventricular septum is flat consistent   with  volume and pressure overload of right ventricle. Mitral valve is structurally normal.  Mitral valve leaflets appear to open adequately. Moderate mitral regurgitation is present. ERO 0.41 with MR volume of 51ml. The left atrium is severely dilated by volume measurement. The aortic valve is structurally normal.  The aortic leaflets appear to open adequately. The aortic valve appears tricuspid. Mild aortic regurgitation is present. The aortic root is slightly enlarged at 3.8cm. Right ventricular systolic function is normal .  The right ventricle is enlarged. Tricuspid valve is structurally normal.  The tricuspid valve leaflets appear to open adequately.   There is moderate tricuspid regurgitation. Systolic pulmonary artery pressure (SPAP) is estimated at 51 mmHg   consistent  with moderate pulmonary hypertension (RA pressure 15 mmHg). The right atrium is severely dilated. IVC size is dilated (>2.1 cm) and collapses < 50% with respiration  consistent with markedly elevated RA pressure (15 mmHg). No obvious masses, thrombi, or vegetations are noted. ECHO done 7/2015  Conclusions    Summary  This is a limited study for CHF, cardiomyopathy and mitral regurgitation. Ejection fraction calculated by Monge''s method is 62 %. LV systolic function is normal with ejection fraction estimated to be   60%. No regional wall motion abnormalities are noted. Mild mitral regurgitation is present. There is mild concentric left ventricular hypertrophy. There is reversal of E/A inflow velocities across the mitral valve  suggesting type I diastolic dysfunction. Mild aortic regurgitation is present. Assessment:       Diagnosis Orders   1. Primary hypertension  Comprehensive Metabolic Panel    CBC with Auto Differential    Lipid Panel    Uric Acid   2. Chronic systolic congestive heart failure ([address]     3. Ischemic cardiomyopathy     4. Hypertensive heart disease with chronic systolic congestive heart failure ([address]     5. Mixed hyperlipidemia  TSH   6. Angioedema, sequela            Plan:      1. Chronic systolic CHF. He is off lasix and aldactone. He is on Coreg. Cannot take Lisinopril due to angioedema. Doing well. 2.  Hypertension: High in the office. He says it is ok at home. Refuses anymore medication. 3.  Hypertensive heart disease. From uncontrolled HTN. 4.  CMP from hypertension. Stable. 5.  MR and AI: stable. 6.  Angioedema from Lisinopril resolved.

## 2022-06-27 NOTE — PROGRESS NOTES
[address] Office Note  2022     Subjective:  Mr. Patricia Hsu is here for cardiac follow up s-CHF, HTN,CMP due to hypertensive heart disease, Mild MR, PVC    [place]:   He presented to ED 2021 with angioedema. Lisinopril was discontinued at that time. Today, he reports that his BP was high when he saw pcp last week. He reports feeling fine. He notes that it runs 001'K systolic  at home that he believes its  fine. Patient with no complaints of chest pain, SOB, palpitations, dizziness, edema, or orthopnea/PND. He denies ever having COVID as of yet. He notes that he does his laundry, household chores, and yard work such as mowing. PMH  s-CHF, HTN,CMP due to hypertensive heart disease, MR, PVC  HTN which had been untreated for 20 years prior to . Admitted to [facility] 1/7/15. His son took his Blood pressure which was noted to be high systolic 559. Patients SOB progressively worsened over week until he had to sleep in recliner for rest.  In the ER his BP was 194/120, EKG revealed Sinus tachycardia; Possible Left atrial enlargement; ST & T wave abnormality, consider lateral ischemia, Troponin was negative, BNP was 30526. Echo EF= 26%, Severe global hypokinesis, Moderate mitral regurgitation, moderate tricuspid regurgitation, moderate pulmonary hypertension. Repeat Limited Echo on 2015 with EF of 62% and improved MR from moderate to mild. Admitted 2 days in 2019 with pneumonia. Review of Systems:  12 point ROS negative in all areas as listed below except as in [place]  Constitutional, EENT,  pulmonary, GI, , Musculoskeletal, neurological, hematological, endocrine, Psychiatric    Reviewed past medical history, social, and family history.     Both parents  suddenly  Nonsmoker no alcohol former smoker 2 ppd x 30 yrs quit 36 yrs ago  Dad no heart dz Mom no heart dz  Past Medical History:   Diagnosis Date    Chronic systolic CHF (congestive heart failure) ([address]     Heart disease  Hyperlipidemia     Hypertension      Past Surgical History:   Procedure Laterality Date    CATARACT REMOVAL WITH IMPLANT Left 3/16/16    EYE SURGERY Right 1/21/16    Cataract removal with implant Dr. Kaleb Jacobs         Objective:   BP (!) 189/66   Pulse 57   Ht 5' 2\" (1.575 m)   Wt 146 lb (66.2 kg)   SpO2 98%   BMI 26.70 kg/m²     Wt Readings from Last 3 Encounters:   06/29/22 146 lb (66.2 kg)   06/20/22 146 lb (66.2 kg)   09/21/21 142 lb (64.4 kg)     BP Recheck 130/80 I personally checked it    Physical Exam:  General: No Respiratory distress, appears well developed and well nourished. Missing teeth  Eyes:  Sclera nonicteric  Nose/Sinuses:  negative findings: nose shows no deformity, asymmetry, or inflammation, nasal mucosa normal, septum midline with no perforation or bleeding  Back:  no pain to palpation  Joint:  no active joint inflammation  Musculoskeletal:  negative  Skin:  Warm and dry extensive bruising both fore arms from little trauma  Neck:  Negative for JVD and Carotid Bruits. No adenopathy  Chest:  Clear to auscultation, respiration easy  Cardiovascular: RRR, S1S2 normal, no murmur, no rub or thrill. Abdomen non tender no mass no abn pulsations  Extremities:   No edema, clubbing, cyanosis,  Neuro: intact    Medications:   Outpatient Encounter Medications as of 6/29/2022   Medication Sig Dispense Refill    atorvastatin (LIPITOR) 10 MG tablet Take 1 tablet by mouth daily 90 tablet 3    amLODIPine (NORVASC) 5 MG tablet Take 1 tablet by mouth daily 90 tablet 3    carvedilol (COREG) 12.5 MG tablet Take 1 tablet by mouth 2 times daily 180 tablet 3    digoxin (LANOXIN) 125 MCG tablet Take 1 tablet by mouth daily 90 tablet 0    aspirin 81 MG chewable tablet Take 1 tablet by mouth daily.  (Patient taking differently: Take 81 mg by mouth every other day ) 30 tablet 0    [DISCONTINUED] atorvastatin (LIPITOR) 10 MG tablet Take 1 tablet by mouth daily 90 tablet 0    [DISCONTINUED] amLODIPine (NORVASC) 5 MG tablet Take 1 tablet by mouth daily 90 tablet 0    [DISCONTINUED] carvedilol (COREG) 12.5 MG tablet Take 1 tablet by mouth 2 times daily 180 tablet 0     No facility-administered encounter medications on file as of 6/29/2022. Lab Data:  Lab Results   Component Value Date     06/20/2022    K 4.0 06/20/2022     06/20/2022    CO2 24 06/20/2022    BUN 16 06/20/2022    CREATININE 1.1 06/20/2022    GLUCOSE 107 (H) 06/20/2022    CALCIUM 9.1 06/20/2022    PROT 7.0 06/20/2022    LABALBU 4.2 06/20/2022    BILITOT 0.9 06/20/2022    ALKPHOS 107 06/20/2022    AST 20 06/20/2022    ALT 15 06/20/2022    LABGLOM >60 06/20/2022    GFRAA >60 06/20/2022    AGRATIO 1.5 06/20/2022    GLOB 3.5 08/20/2021       Lab Results   Component Value Date    WBC 6.8 06/20/2022    HGB 14.0 06/20/2022    HCT 40.6 06/20/2022    MCV 91.3 06/20/2022     06/20/2022     Lab Results   Component Value Date    TSH 2.40 06/20/2022     Lab Results   Component Value Date    LABA1C 5.2 01/08/2015     Lab Results   Component Value Date    .5 01/08/2015     LIPID:   No components found for: CHLPL  Lab Results   Component Value Date    TRIG 187 (H) 06/20/2022    TRIG 154 (H) 08/09/2019    TRIG 187 (H) 06/26/2018     Lab Results   Component Value Date    HDL 31 (L) 06/20/2022    HDL 41 10/12/2020    HDL 33 (L) 08/09/2019     Lab Results   Component Value Date    LDLCALC 68 06/20/2022    LDLCALC 62 10/12/2020    LDLCALC 61 08/09/2019     Lab Results   Component Value Date    LABVLDL 37 06/20/2022    LABVLDL 31 10/12/2020    LABVLDL 31 08/09/2019     PT/INR: No results for input(s): PROTIME, INR in the last 72 hours. A1C:   Lab Results   Component Value Date    LABA1C 5.2 01/08/2015     BNP:  No results for input(s): BNP in the last 72 hours.     IMAGING:   I have reviewed the following tests and documented in this encounter as follows:   Discussed with patient    EKG 8/19/2021  Sinus bradycardia  Nonspecific ST and T wave abnormality  52 bpm    CT chest 3/23/19   1. Right upper lobe and right lower lobe nodular infiltrates probably represent pneumonia. 2. Right apical pulmonary nodule. Recommended follow-up: In a low-risk patient, CT at 6-12 months, then consider CT at 18-24 months. In a high-risk patient, CT at 6-12 months, then CT at 18-24 months. 3. The smaller patchy opacities bilaterally could represent atelectasis or pneumonia. Chest Xray 3/15/19  No acute disease. EKG 1/14/16 SB with non specific ST and T changes in lateral leads     ECHO 7/21/15  Summary  This is a limited study for CHF, cardiomyopathy and mitral regurgitation. Ejection fraction calculated by  is 62 %. LV systolic function is normal with ejection fraction estimated to be 60%. No regional wall motion abnormalities are noted. Mild mitral regurgitation is present. There is mild concentric left ventricular hypertrophy. There is reversal of E/A inflow velocities across the mitral valve  suggesting type I diastolic dysfunction. Mild aortic regurgitation is present. ECHO 1/7/15  Summary  sinus rhythm is present during the test.  The ejection fraction measured by Monge's method is 26 %. Severe global hypokinesis is present. Left ventricular size is at upper limit of normal.  There is mild concentric left ventricular hypertrophy. Doppler study suggests diastolic dysfunction with impaired relaxation and  elevated filling pressure. Interventricular septum is flat consistent   with  volume and pressure overload of right ventricle. Mitral valve is structurally normal.  Mitral valve leaflets appear to open adequately. Moderate mitral regurgitation is present. ERO 0.41 with MR volume of 51ml. The left atrium is severely dilated by volume measurement. The aortic valve is structurally normal.  The aortic leaflets appear to open adequately. The aortic valve appears tricuspid. Mild aortic regurgitation is present.   The aortic root is slightly enlarged at 3.8cm. Right ventricular systolic function is normal .  The right ventricle is enlarged. Tricuspid valve is structurally normal.  The tricuspid valve leaflets appear to open adequately. There is moderate tricuspid regurgitation. Systolic pulmonary artery pressure (SPAP) is estimated at 51 mmHg consistentwith moderate pulmonary hypertension (RA pressure 15 mmHg). The right atrium is severely dilated. IVC size is dilated (>2.1 cm) and collapses < 50% with respiration consistent with markedly elevated RA pressure (15 mmHg). No obvious masses, thrombi, or vegetations are noted.     LABS:  Lab Results   Component Value Date     06/20/2022    K 4.0 06/20/2022     06/20/2022    CO2 24 06/20/2022    BUN 16 06/20/2022    CREATININE 1.1 06/20/2022    GLUCOSE 107 (H) 06/20/2022    CALCIUM 9.1 06/20/2022    PROT 7.0 06/20/2022    LABALBU 4.2 06/20/2022    BILITOT 0.9 06/20/2022    ALKPHOS 107 06/20/2022    AST 20 06/20/2022    ALT 15 06/20/2022    LABGLOM >60 06/20/2022    GFRAA >60 06/20/2022    AGRATIO 1.5 06/20/2022    GLOB 3.5 08/20/2021       Lab Results   Component Value Date    WBC 6.8 06/20/2022    HGB 14.0 06/20/2022    HCT 40.6 06/20/2022    MCV 91.3 06/20/2022     06/20/2022     Lab Results   Component Value Date    TSH 2.40 06/20/2022     Lab Results   Component Value Date    LABA1C 5.2 01/08/2015     Lab Results   Component Value Date    .5 01/08/2015     Lab Results   Component Value Date    CHOL 136 06/20/2022    CHOL 125 08/09/2019    CHOL 240 (H) 06/26/2018     Lab Results   Component Value Date    TRIG 187 (H) 06/20/2022    TRIG 154 (H) 08/09/2019    TRIG 187 (H) 06/26/2018     Lab Results   Component Value Date    HDL 31 (L) 06/20/2022    HDL 41 10/12/2020    HDL 33 (L) 08/09/2019     Lab Results   Component Value Date    LDLCALC 68 06/20/2022    LDLCALC 62 10/12/2020    LDLCALC 61 08/09/2019     Lab Results   Component Value Date    LABVLDL 37 06/20/2022 LABVLDL 31 10/12/2020    LABVLDL 31 08/09/2019     No results found for: THA    Assessment:  Encounter Diagnoses   Name Primary?  Dilated cardiomyopathy ([facility] [address] Yes    Hypertensive heart disease with chronic systolic congestive heart failure ([facility] [address]     Primary hypertension     Mixed hyperlipidemia        Cardiomyopathy  Due to untreated hypertension (HCC)EF normalized, no clinical signs of CHF. He is very active outside home. He has PVC's   Hypertensive heart disease     Mild Mitral valve insufficiency due to cardiomyopathy. Chronic systolic CHF (congestive heart failure) (HCC) resolved    Essential hypertension,  BP normal on recheck     Last lipids 6/20/2022 I personally reviewed labs results in epic and discussed with patient   No CAD or cerebrovascular disease. Plan:  1. Meds reviewed. BP is controlled with amlodipine and carvedilol       Refills  Warranted sent 90 days x3   2. Labs in [facility] 6.20.22 satisfactory discussed with patient Continue current course    Plan to follow up in a year    QUALITY MEASURES  1. Tobacco Cessation Counseling: NA  2. Retake of BP if >140/90:  NA  3. Documentation to PCP/referring for new patient:  Sent to PCP at close of office visit  4.   patient on anti-platelet: NA   5.  patient on STATIN therapy:  yes  6. Patient with CHF and aFib on anticoagulation:  NA     This note was scribed in the presence of Dougie Mathew MD by Ashley Jasso RN. I, Dr. Ivana Valverde, personally performed the services described in this documentation, as scribed by the above signed scribe in my presence. It is both accurate and complete to my knowledge. I agree with the details independently gathered by the clinical support staff, while the remaining scribed note accurately describes my personal service to the patient.       Dougie Mathew MD  [address]  [phone] [facility] office  [phone] [facility]

## 2022-06-29 ENCOUNTER — OFFICE VISIT (OUTPATIENT)
Dept: CARDIOLOGY CLINIC | Age: 87
End: 2022-06-29
Payer: MEDICARE

## 2022-06-29 VITALS
HEART RATE: 57 BPM | OXYGEN SATURATION: 98 % | HEIGHT: 62 IN | BODY MASS INDEX: 26.87 KG/M2 | WEIGHT: 146 LBS | SYSTOLIC BLOOD PRESSURE: 189 MMHG | DIASTOLIC BLOOD PRESSURE: 66 MMHG

## 2022-06-29 DIAGNOSIS — I50.22 HYPERTENSIVE HEART DISEASE WITH CHRONIC SYSTOLIC CONGESTIVE HEART FAILURE (HCC): ICD-10-CM

## 2022-06-29 DIAGNOSIS — I11.0 HYPERTENSIVE HEART DISEASE WITH CHRONIC SYSTOLIC CONGESTIVE HEART FAILURE (HCC): ICD-10-CM

## 2022-06-29 DIAGNOSIS — I10 PRIMARY HYPERTENSION: ICD-10-CM

## 2022-06-29 DIAGNOSIS — I42.0 DILATED CARDIOMYOPATHY (HCC): Primary | ICD-10-CM

## 2022-06-29 DIAGNOSIS — E78.2 MIXED HYPERLIPIDEMIA: ICD-10-CM

## 2022-06-29 PROCEDURE — 99214 OFFICE O/P EST MOD 30 MIN: CPT | Performed by: INTERNAL MEDICINE

## 2022-06-29 PROCEDURE — 1123F ACP DISCUSS/DSCN MKR DOCD: CPT | Performed by: INTERNAL MEDICINE

## 2022-06-29 RX ORDER — AMLODIPINE BESYLATE 5 MG/1
5 TABLET ORAL DAILY
Qty: 90 TABLET | Refills: 3 | Status: SHIPPED | OUTPATIENT
Start: 2022-06-29

## 2022-06-29 RX ORDER — ATORVASTATIN CALCIUM 10 MG/1
10 TABLET, FILM COATED ORAL DAILY
Qty: 90 TABLET | Refills: 3 | Status: SHIPPED | OUTPATIENT
Start: 2022-06-29

## 2022-06-29 RX ORDER — CARVEDILOL 12.5 MG/1
12.5 TABLET ORAL 2 TIMES DAILY
Qty: 180 TABLET | Refills: 3 | Status: SHIPPED | OUTPATIENT
Start: 2022-06-29

## 2022-06-29 NOTE — PATIENT INSTRUCTIONS
Plan:  1. Meds reviewed. Refills  Warranted  2.  Continue current course    Plan to follow up in a year

## 2022-06-29 NOTE — LETTER
[address] [address]  Phone: [phone]  Fax: [phone]    Greig Jeans, MD    August 16, 2022     [facility], [address] [place] [address]    Patient: Fawad Jaimes   MR Number: [id number]   YOB: 1934   Date of Visit: 6/29/2022       Dear Oumar Pizano:    Thank you for referring Corine Parnell to me for evaluation/treatment. Below are the relevant portions of my assessment and plan of care. If you have questions, please do not hesitate to call me. I look forward to following Maria Luisa Xie along with you.     Sincerely,      Greig Jeans, MD

## 2022-10-20 ENCOUNTER — OFFICE VISIT (OUTPATIENT)
Dept: INTERNAL MEDICINE CLINIC | Age: 87
End: 2022-10-20

## 2022-10-20 VITALS
HEART RATE: 70 BPM | WEIGHT: 142 LBS | SYSTOLIC BLOOD PRESSURE: 140 MMHG | BODY MASS INDEX: 26.13 KG/M2 | HEIGHT: 62 IN | DIASTOLIC BLOOD PRESSURE: 80 MMHG | RESPIRATION RATE: 12 BRPM

## 2022-10-20 DIAGNOSIS — D49.2 SKIN NEOPLASM: ICD-10-CM

## 2022-10-20 DIAGNOSIS — I50.22 HYPERTENSIVE HEART DISEASE WITH CHRONIC SYSTOLIC CONGESTIVE HEART FAILURE (HCC): ICD-10-CM

## 2022-10-20 DIAGNOSIS — I34.0 NONRHEUMATIC MITRAL VALVE REGURGITATION: ICD-10-CM

## 2022-10-20 DIAGNOSIS — E78.2 MIXED HYPERLIPIDEMIA: ICD-10-CM

## 2022-10-20 DIAGNOSIS — I50.22 CHRONIC SYSTOLIC CONGESTIVE HEART FAILURE (HCC): ICD-10-CM

## 2022-10-20 DIAGNOSIS — Z00.00 INITIAL MEDICARE ANNUAL WELLNESS VISIT: Primary | ICD-10-CM

## 2022-10-20 DIAGNOSIS — Z00.00 MEDICARE ANNUAL WELLNESS VISIT, SUBSEQUENT: ICD-10-CM

## 2022-10-20 DIAGNOSIS — I11.0 HYPERTENSIVE HEART DISEASE WITH CHRONIC SYSTOLIC CONGESTIVE HEART FAILURE (HCC): ICD-10-CM

## 2022-10-20 DIAGNOSIS — I10 PRIMARY HYPERTENSION: ICD-10-CM

## 2022-10-20 PROCEDURE — 1123F ACP DISCUSS/DSCN MKR DOCD: CPT | Performed by: INTERNAL MEDICINE

## 2022-10-20 PROCEDURE — G0438 PPPS, INITIAL VISIT: HCPCS | Performed by: INTERNAL MEDICINE

## 2022-10-20 ASSESSMENT — PATIENT HEALTH QUESTIONNAIRE - PHQ9
2. FEELING DOWN, DEPRESSED OR HOPELESS: 0
1. LITTLE INTEREST OR PLEASURE IN DOING THINGS: 0
SUM OF ALL RESPONSES TO PHQ QUESTIONS 1-9: 0
SUM OF ALL RESPONSES TO PHQ9 QUESTIONS 1 & 2: 0
SUM OF ALL RESPONSES TO PHQ QUESTIONS 1-9: 0

## 2022-10-20 ASSESSMENT — LIFESTYLE VARIABLES
HOW OFTEN DO YOU HAVE A DRINK CONTAINING ALCOHOL: NEVER
HOW MANY STANDARD DRINKS CONTAINING ALCOHOL DO YOU HAVE ON A TYPICAL DAY: PATIENT DOES NOT DRINK

## 2022-10-20 NOTE — PROGRESS NOTES
Medicare Annual Wellness Visit    Maritza Haddad is here for Medicare AWV    Assessment & Plan   Medicare annual wellness visit, subsequent  Hypertensive heart disease with chronic systolic congestive heart failure ([facility] [address]  Primary hypertension  Chronic systolic congestive heart failure ([facility] [address]  Mixed hyperlipidemia  Nonrheumatic mitral valve regurgitation  Skin neoplasm        -Medicare physical done  - HTN with HTN heart disease. His BP is ok today. - Chronic systolic CHF is stable. - HLD stable. - I suspect basal cell cancer. He refuses dermatology referral.  - Mitral regurgitation. Stable. Recommendations for Preventive Services Due: see orders and patient instructions/AVS.  Recommended screening schedule for the next 5-10 years is provided to the patient in written form: see Patient Instructions/AVS.     No follow-ups on file. Subjective   The following acute and/or chronic problems were also addressed today:    Patient is here for follow up. He has hypertension. It is under good control at home. It was 151 SBP at home. It is high in the office. He says he will not take any more BP medication and does not care if his \"BP high in the office. He is very active at home and still does cooking and cleaning. He can climb a tree. He has chronic systolic CHF. No orthopnea or PND attacks. He has mild pedal edema. He is off Lasix and Aldactone. He has seen cardiology. No visits to the Er. He denies any chest pain. No dyspnea. He has cardiomyopathy causing above problems. He has mitral regurgitation/aortic regurgitation. It is stable. He has history of angioedema to lisinopril. Patient's complete Health Risk Assessment and screening values have been reviewed and are found in Flowsheets. The following problems were reviewed today and where indicated follow up appointments were made and/or referrals ordered.     Positive Risk Factor Screenings with Interventions:             General Health and ACP:  General  In general, how would you say your health is?: Good  In the past 7 days, have you experienced any of the following: New or Increased Pain, New or Increased Fatigue, Loneliness, Social Isolation, Stress or Anger?: No  Do you get the social and emotional support that you need?: (!) No  Do you have a Living Will?: (!) No    Advance Directives       Power of  Living Will ACP-Advance Directive ACP-Power of     Not on File Not on File Not on File Not on File        General Health Risk Interventions:  No Living Will: Patient declines ACP discussion/assistance    Health Habits/Nutrition:  Physical Activity: Inactive    Days of Exercise per Week: 0 days    Minutes of Exercise per Session: 0 min     Have you lost any weight without trying in the past 3 months?: No  Body mass index: (!) 25.97  Have you seen the dentist within the past year?: (!) No  Health Habits/Nutrition Interventions:  Nutritional issues:  educational materials for healthy, well-balanced diet provided  Dental exam overdue:  patient declines dental evaluation     Safety:  Do you have working smoke detectors?: Yes  Do you have any tripping hazards - loose or unsecured carpets or rugs?: No  Do you have any tripping hazards - clutter in doorways, halls, or stairs?: No  Do you have either shower bars, grab bars, non-slip mats or non-slip surfaces in your shower or bathtub?: (!) No  Do all of your stairways have a railing or banister?: Not Applicable  Do you always fasten your seatbelt when you are in a car?: (!) No  Safety Interventions:  Home safety tips provided           Objective   Vitals:    10/20/22 1042   BP: (!) 140/80   Site: Right Upper Arm   Position: Sitting   Cuff Size: Medium Adult   Pulse: 70   Resp: 12   Weight: 142 lb (64.4 kg)   Height: 5' 2\" (1.575 m)      Body mass index is 25.97 kg/m².       General Appearance: alert and oriented to person, place and time, well developed and well- nourished, in no acute distress  Skin: warm and dry, no rash or erythema. He has a skin lesion in front of the right ear lobe. Head: normocephalic and atraumatic  Eyes: pupils equal, round, and reactive to light, extraocular eye movements intact, conjunctivae normal  ENT: tympanic membrane, external ear and ear canal normal bilaterally, nose without deformity, nasal mucosa and turbinates normal without polyps  Neck: supple and non-tender without mass, no thyromegaly or thyroid nodules, no cervical lymphadenopathy  Pulmonary/Chest: clear to auscultation bilaterally- no wheezes, rales or rhonchi, normal air movement, no respiratory distress  Cardiovascular: normal rate, regular rhythm, normal S1 and S2, PSM mitral area, rubs, clicks, or gallops, distal pulses intact, no carotid bruits  Abdomen: soft, non-tender, non-distended, normal bowel sounds, no masses or organomegaly  Extremities: no cyanosis, clubbing or edema  Musculoskeletal: normal range of motion, no joint swelling, deformity or tenderness  Neurologic: reflexes normal and symmetric, no cranial nerve deficit, gait, coordination and speech normal       Allergies   Allergen Reactions    Ace Inhibitors Angioedema     Prior to Visit Medications    Medication Sig Taking? Authorizing Provider   atorvastatin (LIPITOR) 10 MG tablet Take 1 tablet by mouth daily  Josiah Summers MD   amLODIPine (NORVASC) 5 MG tablet Take 1 tablet by mouth daily  Josiah Summers MD   carvedilol (COREG) 12.5 MG tablet Take 1 tablet by mouth 2 times daily  Josiah Summers MD   digoxin (LANOXIN) 125 MCG tablet Take 1 tablet by mouth daily  Josiah Summers MD   aspirin 81 MG chewable tablet Take 1 tablet by mouth daily.   Patient taking differently: Take 81 mg by mouth every other day   Axel Ortiz MD       [facility] (Including outside providers/suppliers regularly involved in providing care):   Patient Care Team:  Gale Zeng MD as PCP - General (Internal Medicine)  Joss El Hieu Padilla MD as PCP - [facility] Empaneled Provider  Marta Galaviz MD (Ophthalmology)  Maranda Hong MD as Consulting Physician (Cardiology)     Reviewed and updated this visit:  Tobacco  Allergies  Meds  Problems  Med Hx  Surg Hx  Soc Hx  Fam Hx

## 2022-10-20 NOTE — PATIENT INSTRUCTIONS
Personalized Preventive Plan for Fernando Johnson - 10/20/2022  Medicare offers a range of preventive health benefits. Some of the tests and screenings are paid in full while other may be subject to a deductible, co-insurance, and/or copay. Some of these benefits include a comprehensive review of your medical history including lifestyle, illnesses that may run in your family, and various assessments and screenings as appropriate. After reviewing your medical record and screening and assessments performed today your provider may have ordered immunizations, labs, imaging, and/or referrals for you. A list of these orders (if applicable) as well as your Preventive Care list are included within your After Visit Summary for your review. Other Preventive Recommendations:    A preventive eye exam performed by an eye specialist is recommended every 1-2 years to screen for glaucoma; cataracts, macular degeneration, and other eye disorders. A preventive dental visit is recommended every 6 months. Try to get at least 150 minutes of exercise per week or 10,000 steps per day on a pedometer . Order or download the FREE \"Exercise & Physical Activity: Your Everyday Guide\" from [employer]. Call 4-142.720.6014 or search [employer] online. You need 0727-8848 mg of calcium and 9524-7355 IU of vitamin D per day. It is possible to meet your calcium requirement with diet alone, but a vitamin D supplement is usually necessary to meet this goal.  When exposed to the sun, use a sunscreen that protects against both UVA and UVB radiation with an SPF of 30 or greater. Reapply every 2 to 3 hours or after sweating, drying off with a towel, or swimming. Always wear a seat belt when traveling in a car. Always wear a helmet when riding a bicycle or motorcycle.   Keep Your Memory Gillian Moura       Many factors can affect your ability to remembera hectic lifestyle, aging, stress, chronic disease, and certain medicines. But, there are steps you can take to sharpen your mind and help preserve your memory. Challenge Your Brain   Regularly challenging your mind may help keeps it in top shape. Good mental exercises include:   Crossword puzzlesUse a dictionary if you need it; you will learn more that way. Brainteasers Try some! Crafts, such as wood working and sewing   Hobbies, such as gardening and building model airplanes   SocializingVisit old friends or join groups to meet new ones. Reading   Learning a new language   Taking a class, whether it be art history or herlinda chi   TravelingExperience the food, history, and culture of your destination   Learning to use a computer   Going to museums, the theater, or thought-provoking movies   Changing things in your daily life, such as reversing your pattern in the grocery store or brushing your teeth using your nondominant hand   Use Memory Aids   There is no need to remember every detail on your own. These memory aids can help:   Calendars and day planners   Electronic organizers to store all sorts of helpful informationThese devices can \"beep\" to remind you of appointments. A book of days to record birthdays, anniversaries, and other occasions that occur on the same date every year   Detailed \"to-do\" lists and strategically placed sticky notes   Quick \"study\" sessionsBefore a gathering, review who will be there so their names will be fresh in your mind. Establish routinesFor example, keep your keys, wallet, and umbrella in the same place all the time or take medicine with your 8:00 AM glass of juice   Live a Healthy Life   Many actions that will keep your body strong will do the same for your mind. For example:   Talk to Your Doctor About Herbs and Supplements    Malnutrition and vitamin deficiencies can impair your mental function. For example, vitamin B12 deficiency can cause a range of symptoms, including confusion.  But, what if your nutritional needs are being met? Can herbs and supplements still offer a benefit? Researchers have investigated a range of natural remedies, such as ginkgo , ginseng , and the supplement phosphatidylserine (PS). So far, though, the evidence is inconsistent as to whether these products can improve memory or thinking. If you are interested in taking herbs and supplements, talk to your doctor first because they may interact with other medicines that you are taking. Exercise Regularly    Among the many benefits of regular exercise are increased blood flow to the brain and decreased risk of certain diseases that can interfere with memory function. One study found that even moderate exercise has a beneficial effect. Examples of \"moderate\" exercise include:   Playing 18 holes of golf once a week, without a cart   Playing tennis twice a week   Walking one mile per day   Manage Stress    It can be tough to remember what is important when your mind is cluttered. Make time for relaxation. Choose activities that calm you down, and make it routine. Manage Chronic Conditions    Side effects of high blood pressure , diabetes, and heart disease can interfere with mental function. Many of the lifestyle steps discussed here can help manage these conditions. Strive to eat a healthy diet, exercise regularly, get stress under control, and follow your doctor's advice for your condition. Minimize Medications    Talk to your doctor about the medicines that you take. Some may be unnecessary. Also, healthy lifestyle habits may lower the need for certain drugs. Last Reviewed: April 2010 Vishal Leslie MD   Updated: 4/13/2010     Keeping Home a [address] [facility]       As we get older, changes in balance, gait, strength, vision, hearing, and cognition make even the most youthful senior more prone to accidents. Falls are one of the leading health risks for older people.  This increased risk of falling is related to:   Aging process (eg, decreased muscle strength, slowed reflexes)   Higher incidence of chronic health problems (eg, arthritis, diabetes) that may limit mobility, agility or sensory awareness   Side effects of medicine (eg, dizziness, blurred vision)especially medicines like prescription pain medicines and drugs used to treat mental health conditions   Depending on the brittleness of your bones, the consequences of a fall can be serious and long lasting. Home Life   Research by the Association of Aging [facility]) shows that some home accidents among older adults can be prevented by making simple lifestyle changes and basic modifications and repairs to the home environment. Here are some lifestyle changes that experts recommend:   Have your hearing and vision checked regularly. Be sure to wear prescription glasses that are right for you. Speak to your doctor or pharmacist about the possible side effects of your medicines. A number of medicines can cause dizziness. If you have problems with sleep, talk to your doctor. Limit your intake of alcohol. If necessary, use a cane or walker to help maintain your balance. Wear supportive, rubber-soled shoes, even at home. If you live in a region that gets wintry weather, you may want to put special cleats on your shoes to prevent you from slipping on the snow and ice. Exercise regularly to help maintain muscle tone, agility, and balance. Always hold the banister when going up or down stairs. Also, use  bars when getting in or out of the bath or shower, or using the toilet. To avoid dizziness, get up slowly from a lying down position. Sit up first, dangling your legs for a minute or two before rising to a standing position. Overall Home Safety Check   According to the Consumer Product Safety Commision's \"Older Consumer Home Safety Checklist,\" it is important to check for potential hazards in each room. And remember, proper lighting is an essential factor in home safety.  If you cannot see clearly, you are more likely to fall. Important questions to ask yourself include:   Are lamp, electric, extension, and telephone cords placed out of the flow of traffic and maintained in good condition? Have frayed cords been replaced? Are all small rugs and runners slip resistant? If not, you can secure them to the floor with a special double-sided carpet tape. Are smoke detectors properly locatedone on every floor of your home and one outside of every sleeping area? Are they in good working order? Are batteries replaced at least once a year? Do you have a well-maintained carbon monoxide detector outside every sleeping are in your home? Does your furniture layout leave plenty of space to maneuver between and around chairs, tables, beds, and sofas? Are hallways, stairs and passages between rooms well lit? Can you reach a lamp without getting out of bed? Are floor surfaces well maintained? Shag rugs, high-pile carpeting, tile floors, and polished wood floors can be particularly slippery. Stairs should always have handrails and be carpeted or fitted with a non-skid tread. Is your telephone easily reachable. Is the cord safely tucked away? Room by Room   According to the Association of Aging, bathrooms and blair are the two most potentially hazardous rooms in your home. In the Kitchen    Be sure your stove is in proper working order and always make sure burners and the oven are off before you go out or go to sleep. Keep pots on the back burners, turn handles away from the front of the stove, and keep stove clean and free of grease build-up. Kitchen ventilation systems and range exhausts should be working properly. Keep flammable objects such as towels and pot holders away from the cooking area except when in use. Make sure kitchen curtains are tied back. Move cords and appliances away from the sink and hot surfaces.  If extension cords are needed, install wiring guides so they do not hang over the sink, range, or working areas. Look for coffee pots, kettles and toaster ovens with automatic shut-offs. Keep a mop handy in the kitchen so you can wipe up spills instantly. You should also have a small fire extinguisher. Arrange your kitchen with frequently used items on lower shelves to avoid the need to stand on a stepstool to reach them. Make sure countertops are well-lit to avoid injuries while cutting and preparing food. In the Bathroom    Use a non-slip mat or decals in the tub and shower, since wet, soapy tile or porcelain surfaces are extremely slippery. Make sure bathroom rugs are non-skid or tape them firmly to the floor. Bathtubs should have at least one, preferably two, grab bars, firmly attached to structural supports in the wall. (Do not use built-in soap holders or glass shower doors as grab bars.)    Tub seats fitted with non-slip material on the legs allow you to wash sitting down. For people with limited mobility, bathtub transfer benches allow you to slide safely into the tub. Raised toilet seats and toilet safety rails are helpful for those with knee or hip problems. In the [facility]    Make sure you use a nightlight and that the area around your bed is clear of potential obstacles. Be careful with electric blankets and never go to sleep with a heating pad, which can cause serious burns even if on a low setting. Use fire-resistant mattress covers and pillows, and NEVER smoke in bed. Keep a phone next to the bed that is programmed to dial 911 at the push of a button. If you have a chronic condition, you may want to sign on with an automatic call-in service. Typically the system includes a small pendant that connects directly to an emergency medical voice-response system. You should also make arrangements to stay in contact with someonefriend, neighbor, family memberon a regular schedule.    Fire Prevention   According to the R17. (Smoke Alarms for Every) 1960 [facility], senior citizens are one of the two highest risk groups for death and serious injuries due to residential fires. When cooking, wear short-sleeved items, never a bulky long-sleeved robe. The [facility]'s Safety Checklist for Older Consumers emphasizes the importance of checking basements, garages, workshops and storage areas for fire hazards, such as volatile liquids, piles of old rags or clothing and overloaded circuits. Never smoke in bed or when lying down on a couch or recliner chair. Small portable electric or kerosene heaters are responsible for many home fires and should be used cautiously if at all. If you do use one, be sure to keep them away from flammable materials. In case of fire, make sure you have a pre-established emergency exit plan. Have a professional check your fireplace and other fuel-burning appliances yearly. Hel[employer]   Baby boomers entering the castelan years will continue to see the development of new products to help older adults live safely and independently in spite of age-related changes. Making Life More Livable  , by Mario Velazquez, lists over 1,000 products for \"living well in the mature years,\" such as bathing and mobility aids, household security devices, ergonomically designed knives and peelers, and faucet valves and knobs for temperature control. Medical supply stores and organizations are good sources of information about products that improve your quality of life and insure your safety.      Last Reviewed: November 2009 Guevara Gayle MD   Updated: 3/7/2011

## 2022-11-06 RX ORDER — DIGOXIN 125 MCG
125 TABLET ORAL DAILY
Qty: 90 TABLET | Refills: 0 | Status: SHIPPED | OUTPATIENT
Start: 2022-11-06

## 2023-01-27 RX ORDER — CARVEDILOL 12.5 MG/1
12.5 TABLET ORAL 2 TIMES DAILY
Qty: 180 TABLET | Refills: 1 | Status: SHIPPED | OUTPATIENT
Start: 2023-01-27

## 2023-01-27 RX ORDER — DIGOXIN 125 MCG
125 TABLET ORAL DAILY
Qty: 90 TABLET | Refills: 1 | Status: SHIPPED | OUTPATIENT
Start: 2023-01-27

## 2023-05-17 ENCOUNTER — OFFICE VISIT (OUTPATIENT)
Dept: INTERNAL MEDICINE CLINIC | Age: 88
End: 2023-05-17

## 2023-05-17 VITALS
HEART RATE: 65 BPM | WEIGHT: 139 LBS | HEIGHT: 62 IN | OXYGEN SATURATION: 98 % | SYSTOLIC BLOOD PRESSURE: 144 MMHG | RESPIRATION RATE: 12 BRPM | DIASTOLIC BLOOD PRESSURE: 80 MMHG | BODY MASS INDEX: 25.58 KG/M2

## 2023-05-17 DIAGNOSIS — E78.2 MIXED HYPERLIPIDEMIA: ICD-10-CM

## 2023-05-17 DIAGNOSIS — I50.22 HYPERTENSIVE HEART DISEASE WITH CHRONIC SYSTOLIC CONGESTIVE HEART FAILURE (HCC): ICD-10-CM

## 2023-05-17 DIAGNOSIS — I10 PRIMARY HYPERTENSION: Primary | ICD-10-CM

## 2023-05-17 DIAGNOSIS — I11.0 HYPERTENSIVE HEART DISEASE WITH CHRONIC SYSTOLIC CONGESTIVE HEART FAILURE (HCC): ICD-10-CM

## 2023-05-17 DIAGNOSIS — I50.22 CHRONIC SYSTOLIC CONGESTIVE HEART FAILURE (HCC): ICD-10-CM

## 2023-05-17 DIAGNOSIS — I34.0 NONRHEUMATIC MITRAL VALVE REGURGITATION: ICD-10-CM

## 2023-05-17 DIAGNOSIS — I42.0 DILATED CARDIOMYOPATHY (HCC): ICD-10-CM

## 2023-05-17 LAB
ALBUMIN SERPL-MCNC: 4.4 G/DL (ref 3.4–5)
ALBUMIN/GLOB SERPL: 1.6 {RATIO} (ref 1.1–2.2)
ALP SERPL-CCNC: 98 U/L (ref 40–129)
ALT SERPL-CCNC: 23 U/L (ref 10–40)
ANION GAP SERPL CALCULATED.3IONS-SCNC: 12 MMOL/L (ref 3–16)
AST SERPL-CCNC: 25 U/L (ref 15–37)
BASOPHILS # BLD: 0.1 K/UL (ref 0–0.2)
BASOPHILS NFR BLD: 0.7 %
BILIRUB SERPL-MCNC: 1.1 MG/DL (ref 0–1)
BUN SERPL-MCNC: 24 MG/DL (ref 7–20)
CALCIUM SERPL-MCNC: 9.9 MG/DL (ref 8.3–10.6)
CHLORIDE SERPL-SCNC: 103 MMOL/L (ref 99–110)
CHOLEST SERPL-MCNC: 143 MG/DL (ref 0–199)
CO2 SERPL-SCNC: 26 MMOL/L (ref 21–32)
CREAT SERPL-MCNC: 0.9 MG/DL (ref 0.8–1.3)
DEPRECATED RDW RBC AUTO: 14.2 % (ref 12.4–15.4)
EOSINOPHIL # BLD: 0.1 K/UL (ref 0–0.6)
EOSINOPHIL NFR BLD: 1.9 %
GFR SERPLBLD CREATININE-BSD FMLA CKD-EPI: >60 ML/MIN/{1.73_M2}
GLUCOSE SERPL-MCNC: 96 MG/DL (ref 70–99)
HCT VFR BLD AUTO: 42.4 % (ref 40.5–52.5)
HDLC SERPL-MCNC: 46 MG/DL (ref 40–60)
HGB BLD-MCNC: 14.9 G/DL (ref 13.5–17.5)
LDLC SERPL CALC-MCNC: 61 MG/DL
LYMPHOCYTES # BLD: 1.9 K/UL (ref 1–5.1)
LYMPHOCYTES NFR BLD: 26.3 %
MCH RBC QN AUTO: 31.8 PG (ref 26–34)
MCHC RBC AUTO-ENTMCNC: 35.2 G/DL (ref 31–36)
MCV RBC AUTO: 90.5 FL (ref 80–100)
MONOCYTES # BLD: 0.6 K/UL (ref 0–1.3)
MONOCYTES NFR BLD: 7.9 %
NEUTROPHILS # BLD: 4.7 K/UL (ref 1.7–7.7)
NEUTROPHILS NFR BLD: 63.2 %
PLATELET # BLD AUTO: 173 K/UL (ref 135–450)
PMV BLD AUTO: 9.2 FL (ref 5–10.5)
POTASSIUM SERPL-SCNC: 4.2 MMOL/L (ref 3.5–5.1)
PROT SERPL-MCNC: 7.2 G/DL (ref 6.4–8.2)
RBC # BLD AUTO: 4.68 M/UL (ref 4.2–5.9)
SODIUM SERPL-SCNC: 141 MMOL/L (ref 136–145)
TRIGL SERPL-MCNC: 182 MG/DL (ref 0–150)
URATE SERPL-MCNC: 4.8 MG/DL (ref 3.5–7.2)
VLDLC SERPL CALC-MCNC: 36 MG/DL
WBC # BLD AUTO: 7.4 K/UL (ref 4–11)

## 2023-05-17 PROCEDURE — 1123F ACP DISCUSS/DSCN MKR DOCD: CPT | Performed by: INTERNAL MEDICINE

## 2023-05-17 PROCEDURE — 99214 OFFICE O/P EST MOD 30 MIN: CPT | Performed by: INTERNAL MEDICINE

## 2023-05-17 ASSESSMENT — ENCOUNTER SYMPTOMS
BACK PAIN: 0
ABDOMINAL PAIN: 0
WHEEZING: 0
SHORTNESS OF BREATH: 0
NAUSEA: 0
RHINORRHEA: 0
VOMITING: 0

## 2023-05-17 NOTE — PROGRESS NOTES
Subjective:      Patient ID: Caroline Reyes is a 80 y.o. male. HPI  Patient is here for follow up. He has hypertension. It is under good control at home. It is high in the office. He says he will not take any more BP medication and does not care if his \"BP high in the office  He has chronic systolic CHF. No orthopnea or PND attacks. He has mild pedal edema. He is off Lasix and Aldactone. He has seen cardiology. No visits to the Er. He denies any chest pain. No dyspnea. He has cardiomyopathy causing above problems. He has mitral regurgitation/aortic regurgitation. It is stable. He has history of angioedema to lisinopril. Review of Systems   Constitutional:  Negative for activity change and appetite change. HENT:  Negative for postnasal drip and rhinorrhea. Respiratory:  Negative for shortness of breath and wheezing. Cardiovascular:  Negative for chest pain, palpitations and leg swelling. Gastrointestinal:  Negative for abdominal pain, nausea and vomiting. Genitourinary:  Negative for difficulty urinating and frequency. Musculoskeletal:  Negative for back pain and joint swelling. Skin:  Negative for rash. Neurological:  Negative for light-headedness. Psychiatric/Behavioral:  Negative for sleep disturbance.         Past Medical History:   Diagnosis Date    Chronic systolic CHF (congestive heart failure) (HCC)     Heart disease     Hyperlipidemia     Hypertension      Past Surgical History:   Procedure Laterality Date    CATARACT REMOVAL WITH IMPLANT Left 3/16/16    EYE SURGERY Right 1/21/16    Cataract removal with implant Dr. Xiomara Colin History   Problem Relation Age of Onset    Heart Disease Mother     High Blood Pressure Mother     Heart Disease Father     High Blood Pressure Father     Diabetes Brother     Heart Disease Brother     Stroke Brother      Social History     Tobacco Use    Smoking status: Former    Smokeless tobacco: Never   Vaping Use

## 2023-06-19 ENCOUNTER — OFFICE VISIT (OUTPATIENT)
Dept: CARDIOLOGY CLINIC | Age: 88
End: 2023-06-19
Payer: MEDICARE

## 2023-06-19 VITALS
HEART RATE: 57 BPM | BODY MASS INDEX: 26.17 KG/M2 | WEIGHT: 142.2 LBS | OXYGEN SATURATION: 99 % | SYSTOLIC BLOOD PRESSURE: 138 MMHG | HEIGHT: 62 IN | DIASTOLIC BLOOD PRESSURE: 84 MMHG

## 2023-06-19 DIAGNOSIS — I50.22 CHRONIC SYSTOLIC CONGESTIVE HEART FAILURE (HCC): Primary | ICD-10-CM

## 2023-06-19 DIAGNOSIS — I42.0 DILATED CARDIOMYOPATHY (HCC): ICD-10-CM

## 2023-06-19 DIAGNOSIS — I34.0 NONRHEUMATIC MITRAL VALVE REGURGITATION: ICD-10-CM

## 2023-06-19 DIAGNOSIS — I10 PRIMARY HYPERTENSION: ICD-10-CM

## 2023-06-19 DIAGNOSIS — E78.2 MIXED HYPERLIPIDEMIA: ICD-10-CM

## 2023-06-19 PROCEDURE — 99214 OFFICE O/P EST MOD 30 MIN: CPT | Performed by: INTERNAL MEDICINE

## 2023-06-19 PROCEDURE — 93000 ELECTROCARDIOGRAM COMPLETE: CPT | Performed by: INTERNAL MEDICINE

## 2023-06-19 PROCEDURE — 1123F ACP DISCUSS/DSCN MKR DOCD: CPT | Performed by: INTERNAL MEDICINE

## 2023-06-19 RX ORDER — DIGOXIN 125 MCG
125 TABLET ORAL DAILY
Qty: 90 TABLET | Refills: 3 | Status: SHIPPED | OUTPATIENT
Start: 2023-06-19

## 2023-06-19 RX ORDER — ATORVASTATIN CALCIUM 10 MG/1
10 TABLET, FILM COATED ORAL DAILY
Qty: 90 TABLET | Refills: 3 | Status: SHIPPED | OUTPATIENT
Start: 2023-06-19

## 2023-06-19 RX ORDER — CARVEDILOL 12.5 MG/1
12.5 TABLET ORAL 2 TIMES DAILY
Qty: 180 TABLET | Refills: 3 | Status: SHIPPED | OUTPATIENT
Start: 2023-06-19

## 2023-06-19 RX ORDER — AMLODIPINE BESYLATE 5 MG/1
5 TABLET ORAL DAILY
Qty: 90 TABLET | Refills: 3 | Status: SHIPPED | OUTPATIENT
Start: 2023-06-19

## 2023-11-17 ENCOUNTER — OFFICE VISIT (OUTPATIENT)
Dept: INTERNAL MEDICINE CLINIC | Age: 88
End: 2023-11-17

## 2023-11-17 VITALS
SYSTOLIC BLOOD PRESSURE: 130 MMHG | HEIGHT: 62 IN | BODY MASS INDEX: 25.95 KG/M2 | WEIGHT: 141 LBS | RESPIRATION RATE: 12 BRPM | HEART RATE: 70 BPM | DIASTOLIC BLOOD PRESSURE: 80 MMHG

## 2023-11-17 DIAGNOSIS — I11.0 HYPERTENSIVE HEART DISEASE WITH CHRONIC SYSTOLIC CONGESTIVE HEART FAILURE (HCC): ICD-10-CM

## 2023-11-17 DIAGNOSIS — Z00.00 MEDICARE ANNUAL WELLNESS VISIT, SUBSEQUENT: Primary | ICD-10-CM

## 2023-11-17 DIAGNOSIS — I50.22 CHRONIC SYSTOLIC CONGESTIVE HEART FAILURE (HCC): ICD-10-CM

## 2023-11-17 DIAGNOSIS — T78.3XXS ANGIOEDEMA, SEQUELA: ICD-10-CM

## 2023-11-17 DIAGNOSIS — I42.0 DILATED CARDIOMYOPATHY (HCC): ICD-10-CM

## 2023-11-17 DIAGNOSIS — I10 PRIMARY HYPERTENSION: ICD-10-CM

## 2023-11-17 DIAGNOSIS — I34.0 NONRHEUMATIC MITRAL VALVE REGURGITATION: ICD-10-CM

## 2023-11-17 DIAGNOSIS — I50.22 HYPERTENSIVE HEART DISEASE WITH CHRONIC SYSTOLIC CONGESTIVE HEART FAILURE (HCC): ICD-10-CM

## 2023-11-17 DIAGNOSIS — E78.2 MIXED HYPERLIPIDEMIA: ICD-10-CM

## 2023-11-17 LAB
ALBUMIN SERPL-MCNC: 4.5 G/DL (ref 3.4–5)
ALBUMIN/GLOB SERPL: 1.5 {RATIO} (ref 1.1–2.2)
ALP SERPL-CCNC: 122 U/L (ref 40–129)
ALT SERPL-CCNC: 26 U/L (ref 10–40)
ANION GAP SERPL CALCULATED.3IONS-SCNC: 12 MMOL/L (ref 3–16)
AST SERPL-CCNC: 29 U/L (ref 15–37)
BASOPHILS # BLD: 0.1 K/UL (ref 0–0.2)
BASOPHILS NFR BLD: 0.9 %
BILIRUB SERPL-MCNC: 1.2 MG/DL (ref 0–1)
BUN SERPL-MCNC: 21 MG/DL (ref 7–20)
CALCIUM SERPL-MCNC: 9.7 MG/DL (ref 8.3–10.6)
CHLORIDE SERPL-SCNC: 102 MMOL/L (ref 99–110)
CO2 SERPL-SCNC: 27 MMOL/L (ref 21–32)
CREAT SERPL-MCNC: 1.1 MG/DL (ref 0.8–1.3)
DEPRECATED RDW RBC AUTO: 14.4 % (ref 12.4–15.4)
EOSINOPHIL # BLD: 0.2 K/UL (ref 0–0.6)
EOSINOPHIL NFR BLD: 3 %
GFR SERPLBLD CREATININE-BSD FMLA CKD-EPI: >60 ML/MIN/{1.73_M2}
GLUCOSE SERPL-MCNC: 109 MG/DL (ref 70–99)
HCT VFR BLD AUTO: 43.1 % (ref 40.5–52.5)
HGB BLD-MCNC: 15 G/DL (ref 13.5–17.5)
LYMPHOCYTES # BLD: 2 K/UL (ref 1–5.1)
LYMPHOCYTES NFR BLD: 26.7 %
MCH RBC QN AUTO: 31.3 PG (ref 26–34)
MCHC RBC AUTO-ENTMCNC: 34.7 G/DL (ref 31–36)
MCV RBC AUTO: 90.1 FL (ref 80–100)
MONOCYTES # BLD: 0.7 K/UL (ref 0–1.3)
MONOCYTES NFR BLD: 9.2 %
NEUTROPHILS # BLD: 4.5 K/UL (ref 1.7–7.7)
NEUTROPHILS NFR BLD: 60.2 %
PLATELET # BLD AUTO: 199 K/UL (ref 135–450)
PMV BLD AUTO: 8.9 FL (ref 5–10.5)
POTASSIUM SERPL-SCNC: 4.2 MMOL/L (ref 3.5–5.1)
PROT SERPL-MCNC: 7.5 G/DL (ref 6.4–8.2)
RBC # BLD AUTO: 4.79 M/UL (ref 4.2–5.9)
SODIUM SERPL-SCNC: 141 MMOL/L (ref 136–145)
URATE SERPL-MCNC: 5.1 MG/DL (ref 3.5–7.2)
WBC # BLD AUTO: 7.5 K/UL (ref 4–11)

## 2023-11-17 PROCEDURE — 1123F ACP DISCUSS/DSCN MKR DOCD: CPT | Performed by: INTERNAL MEDICINE

## 2023-11-17 PROCEDURE — G0439 PPPS, SUBSEQ VISIT: HCPCS | Performed by: INTERNAL MEDICINE

## 2023-11-17 ASSESSMENT — PATIENT HEALTH QUESTIONNAIRE - PHQ9
1. LITTLE INTEREST OR PLEASURE IN DOING THINGS: 0
SUM OF ALL RESPONSES TO PHQ QUESTIONS 1-9: 0
2. FEELING DOWN, DEPRESSED OR HOPELESS: 0
SUM OF ALL RESPONSES TO PHQ9 QUESTIONS 1 & 2: 0
SUM OF ALL RESPONSES TO PHQ QUESTIONS 1-9: 0

## 2023-11-17 ASSESSMENT — LIFESTYLE VARIABLES
HOW MANY STANDARD DRINKS CONTAINING ALCOHOL DO YOU HAVE ON A TYPICAL DAY: PATIENT DOES NOT DRINK
HOW OFTEN DO YOU HAVE A DRINK CONTAINING ALCOHOL: NEVER

## 2023-11-17 NOTE — PROGRESS NOTES
Medicare Annual Wellness Visit    Lea Sewell is here for Medicare AWV    Assessment & Plan   Medicare annual wellness visit, subsequent  Hypertensive heart disease with chronic systolic congestive heart failure ([address])  -     Comprehensive Metabolic Panel; Future  -     CBC with Auto Differential; Future  -     Uric Acid; Future  Chronic systolic congestive heart failure (HCC)  Dilated cardiomyopathy (HCC)  Nonrheumatic mitral valve regurgitation  Primary hypertension  Mixed hyperlipidemia  Angioedema, sequela      -Medicare exam  - HTN well controlled  - Chronic systolic CHF stable  - HLD at goal  - refused vaccine    Recommendations for Preventive Services Due: see orders and patient instructions/AVS.  Recommended screening schedule for the next 5-10 years is provided to the patient in written form: see Patient Instructions/AVS.     Return in 3 months (on 2/17/2024). Subjective     He is here for a Medicare exam.    Patient is here for follow up. He has hypertension. It is under good control at home. It is high in the office. He says he will not take any more BP medication and does not care if his \"BP high in the office  He has chronic systolic CHF. No orthopnea or PND attacks. He has mild pedal edema. He is off Lasix and Aldactone. He has seen cardiology. No visits to the Er. He denies any chest pain. No dyspnea. He has cardiomyopathy causing above problems. He has mitral regurgitation/aortic regurgitation. It is stable. He has history of angioedema to lisinopril. Patient's complete Health Risk Assessment and screening values have been reviewed and are found in Flowsheets. The following problems were reviewed today and where indicated follow up appointments were made and/or referrals ordered.     Positive Risk Factor Screenings with Interventions:                 Weight and Activity:  Physical Activity: Inactive (11/17/2023)    Exercise Vital Sign     Days of Exercise per Week: 0 days

## 2024-03-06 ENCOUNTER — OFFICE VISIT (OUTPATIENT)
Dept: INTERNAL MEDICINE CLINIC | Age: 89
End: 2024-03-06

## 2024-03-06 VITALS
SYSTOLIC BLOOD PRESSURE: 130 MMHG | RESPIRATION RATE: 12 BRPM | HEART RATE: 70 BPM | BODY MASS INDEX: 26.5 KG/M2 | HEIGHT: 62 IN | WEIGHT: 144 LBS | DIASTOLIC BLOOD PRESSURE: 80 MMHG

## 2024-03-06 DIAGNOSIS — I10 PRIMARY HYPERTENSION: ICD-10-CM

## 2024-03-06 DIAGNOSIS — I50.22 HYPERTENSIVE HEART DISEASE WITH CHRONIC SYSTOLIC CONGESTIVE HEART FAILURE (HCC): ICD-10-CM

## 2024-03-06 DIAGNOSIS — I34.0 NONRHEUMATIC MITRAL VALVE REGURGITATION: ICD-10-CM

## 2024-03-06 DIAGNOSIS — I50.22 CHRONIC SYSTOLIC CONGESTIVE HEART FAILURE (HCC): Primary | ICD-10-CM

## 2024-03-06 DIAGNOSIS — I42.0 DILATED CARDIOMYOPATHY (HCC): ICD-10-CM

## 2024-03-06 DIAGNOSIS — I11.0 HYPERTENSIVE HEART DISEASE WITH CHRONIC SYSTOLIC CONGESTIVE HEART FAILURE (HCC): ICD-10-CM

## 2024-03-06 DIAGNOSIS — E78.2 MIXED HYPERLIPIDEMIA: ICD-10-CM

## 2024-03-06 PROBLEM — T78.3XXA ANGIO-EDEMA: Status: RESOLVED | Noted: 2021-08-19 | Resolved: 2024-03-06

## 2024-03-06 PROCEDURE — 99214 OFFICE O/P EST MOD 30 MIN: CPT | Performed by: INTERNAL MEDICINE

## 2024-03-06 PROCEDURE — 1123F ACP DISCUSS/DSCN MKR DOCD: CPT | Performed by: INTERNAL MEDICINE

## 2024-03-06 ASSESSMENT — ENCOUNTER SYMPTOMS
BACK PAIN: 0
NAUSEA: 0
VOMITING: 0
ABDOMINAL PAIN: 0
RHINORRHEA: 0
SHORTNESS OF BREATH: 0
WHEEZING: 0

## 2024-03-06 NOTE — PROGRESS NOTES
Subjective:      Patient ID: Jimmy Jose is a 89 y.o. male.    HPI  Patient is here for follow up.       He has hypertension. It is under good control at home.  He has chronic systolic CHF. No orthopnea or PND attacks. He has mild pedal edema. He is off Lasix and Aldactone. He has seen cardiology. No visits to the Er.   He denies any chest pain. No dyspnea. He has cardiomyopathy causing above problems.  He has mitral regurgitation/aortic regurgitation. It is stable.    He has history of angioedema to lisinopril.     Review of Systems   Constitutional:  Negative for activity change and appetite change.   HENT:  Negative for postnasal drip and rhinorrhea.    Respiratory:  Negative for shortness of breath and wheezing.    Cardiovascular:  Negative for chest pain, palpitations and leg swelling.   Gastrointestinal:  Negative for abdominal pain, nausea and vomiting.   Genitourinary:  Negative for difficulty urinating and frequency.   Musculoskeletal:  Negative for back pain and joint swelling.   Skin:  Negative for rash.   Neurological:  Negative for light-headedness.   Psychiatric/Behavioral:  Negative for sleep disturbance.          Past Medical History:   Diagnosis Date    Angio-edema 08/19/2021    Chronic systolic CHF (congestive heart failure) (HCC)     Heart disease     Hyperlipidemia     Hypertension      Past Surgical History:   Procedure Laterality Date    CATARACT REMOVAL WITH IMPLANT Left 3/16/16    EYE SURGERY Right 1/21/16    Cataract removal with implant Dr. Klein    FRACTURE SURGERY       Family History   Problem Relation Age of Onset    Heart Disease Mother     High Blood Pressure Mother     Heart Disease Father     High Blood Pressure Father     Diabetes Brother     Heart Disease Brother     Stroke Brother      Social History     Tobacco Use    Smoking status: Former    Smokeless tobacco: Never   Vaping Use    Vaping Use: Never used   Substance Use Topics    Alcohol use: No    Drug use: No     Current

## 2024-06-27 NOTE — PROGRESS NOTES
[facility] Office Note  2024     Subjective:  Mr. Jose is here for cardiac follow up s-CHF,EF recovered to normal, HTN, CMP due to hypertensive heart disease, Mild MR, PVC  C/o no cardiac complaints    He comes in after a yr for office visit.  [place]:   Today, he is doing well. He reports he lives with his son. Patient denies current edema, chest pain, shortness of breath, palpitations, dizziness or syncope. Patient is taking all cardiac medications as prescribed and tolerates them well.     PMH  s-CHF, HTN,CMP due to hypertensive heart disease, MR, PVC  HTN which had been untreated for 20 years prior to .   Admitted to [facility] 1/7/15.  His son took his Blood pressure which was noted to be high systolic 217. Patients SOB progressively worsened over week until he had to sleep in recliner for rest.  In the ER his BP was 194/120, EKG revealed Sinus tachycardia; Possible Left atrial enlargement; ST & T wave abnormality, consider lateral ischemia, Troponin was negative, BNP was 61758. Echo EF= 26%, Severe global hypokinesis, Moderate mitral regurgitation, moderate tricuspid regurgitation, moderate pulmonary hypertension. Repeat Limited Echo on 2015 with EF of 62% and improved MR from moderate to mild.    Admitted 2 days in 2019 with pneumonia.  He presented to ED 2021 with angioedema.  Lisinopril was discontinued at that time.    Review of Systems:  12 point ROS negative in all areas as listed below except as in [place]  Constitutional, EENT,  pulmonary, GI, , Musculoskeletal, neurological, hematological, endocrine, Psychiatric    Reviewed past medical history, social, and family history.    Both parents  suddenly  Nonsmoker no alcohol former smoker 2 ppd x 30 yrs quit 40 yrs ago  Dad no heart dz Mom no heart dz  Past Medical History:   Diagnosis Date    Angio-edema 2021    Chronic systolic CHF (congestive heart failure) (HCC)     Heart disease     Hyperlipidemia     Hypertension

## 2024-07-09 ENCOUNTER — HOSPITAL ENCOUNTER (OUTPATIENT)
Age: 89
Discharge: HOME | End: 2024-07-09
Payer: MEDICARE

## 2024-07-09 ENCOUNTER — OFFICE VISIT (OUTPATIENT)
Dept: CARDIOLOGY CLINIC | Age: 89
End: 2024-07-09
Payer: MEDICARE

## 2024-07-09 VITALS
BODY MASS INDEX: 25.76 KG/M2 | OXYGEN SATURATION: 98 % | HEIGHT: 62 IN | HEART RATE: 60 BPM | DIASTOLIC BLOOD PRESSURE: 66 MMHG | SYSTOLIC BLOOD PRESSURE: 130 MMHG | WEIGHT: 140 LBS

## 2024-07-09 DIAGNOSIS — E78.2 MIXED HYPERLIPIDEMIA: ICD-10-CM

## 2024-07-09 DIAGNOSIS — I11.0 HYPERTENSIVE HEART DISEASE WITH CHRONIC SYSTOLIC CONGESTIVE HEART FAILURE (HCC): Primary | ICD-10-CM

## 2024-07-09 DIAGNOSIS — Z79.899 MEDICATION MANAGEMENT: ICD-10-CM

## 2024-07-09 DIAGNOSIS — I50.22 CHRONIC SYSTOLIC CONGESTIVE HEART FAILURE (HCC): ICD-10-CM

## 2024-07-09 DIAGNOSIS — I50.22 HYPERTENSIVE HEART DISEASE WITH CHRONIC SYSTOLIC CONGESTIVE HEART FAILURE (HCC): Primary | ICD-10-CM

## 2024-07-09 PROCEDURE — 85025 COMPLETE CBC W/AUTO DIFF WBC: CPT

## 2024-07-09 PROCEDURE — 84443 ASSAY THYROID STIM HORMONE: CPT

## 2024-07-09 PROCEDURE — 36415 COLL VENOUS BLD VENIPUNCTURE: CPT

## 2024-07-09 PROCEDURE — 1123F ACP DISCUSS/DSCN MKR DOCD: CPT | Performed by: INTERNAL MEDICINE

## 2024-07-09 PROCEDURE — 93000 ELECTROCARDIOGRAM COMPLETE: CPT | Performed by: INTERNAL MEDICINE

## 2024-07-09 PROCEDURE — 80061 LIPID PANEL: CPT

## 2024-07-09 PROCEDURE — 80053 COMPREHEN METABOLIC PANEL: CPT

## 2024-07-09 PROCEDURE — 99214 OFFICE O/P EST MOD 30 MIN: CPT | Performed by: INTERNAL MEDICINE

## 2024-07-09 RX ORDER — CARVEDILOL 12.5 MG/1
12.5 TABLET ORAL 2 TIMES DAILY
Qty: 180 TABLET | Refills: 3 | Status: SHIPPED | OUTPATIENT
Start: 2024-07-09

## 2024-07-09 RX ORDER — ATORVASTATIN CALCIUM 10 MG/1
10 TABLET, FILM COATED ORAL DAILY
Qty: 90 TABLET | Refills: 3 | Status: SHIPPED | OUTPATIENT
Start: 2024-07-09

## 2024-07-09 RX ORDER — AMLODIPINE BESYLATE 5 MG/1
5 TABLET ORAL DAILY
Qty: 90 TABLET | Refills: 3 | Status: SHIPPED | OUTPATIENT
Start: 2024-07-09

## 2024-07-09 NOTE — PATIENT INSTRUCTIONS
Labs reviewed in epic and discussed with patient.  Medications reviewed in office. Medications refilled as warranted  Stop taking digoxin due to first degree heartblock, his HR, and his age.   Please obtain the following labs; -LIPID, CBC, CMP, TSH

## 2024-07-10 ENCOUNTER — OFFICE VISIT (OUTPATIENT)
Dept: INTERNAL MEDICINE CLINIC | Age: 89
End: 2024-07-10

## 2024-07-10 ENCOUNTER — TELEPHONE (OUTPATIENT)
Dept: CARDIOLOGY CLINIC | Age: 89
End: 2024-07-10

## 2024-07-10 VITALS
DIASTOLIC BLOOD PRESSURE: 80 MMHG | RESPIRATION RATE: 1 BRPM | BODY MASS INDEX: 25.76 KG/M2 | WEIGHT: 140 LBS | HEART RATE: 70 BPM | HEIGHT: 62 IN | SYSTOLIC BLOOD PRESSURE: 150 MMHG

## 2024-07-10 DIAGNOSIS — I50.22 CHRONIC SYSTOLIC CONGESTIVE HEART FAILURE (HCC): ICD-10-CM

## 2024-07-10 DIAGNOSIS — I34.0 NONRHEUMATIC MITRAL VALVE REGURGITATION: ICD-10-CM

## 2024-07-10 DIAGNOSIS — I42.0 DILATED CARDIOMYOPATHY (HCC): ICD-10-CM

## 2024-07-10 DIAGNOSIS — I50.22 HYPERTENSIVE HEART DISEASE WITH CHRONIC SYSTOLIC CONGESTIVE HEART FAILURE (HCC): ICD-10-CM

## 2024-07-10 DIAGNOSIS — I10 PRIMARY HYPERTENSION: Primary | ICD-10-CM

## 2024-07-10 DIAGNOSIS — E78.2 MIXED HYPERLIPIDEMIA: ICD-10-CM

## 2024-07-10 DIAGNOSIS — I11.0 HYPERTENSIVE HEART DISEASE WITH CHRONIC SYSTOLIC CONGESTIVE HEART FAILURE (HCC): ICD-10-CM

## 2024-07-10 LAB
ALBUMIN SERPL-MCNC: 4 G/DL (ref 3.4–5)
ALBUMIN/GLOB SERPL: 1.1 {RATIO} (ref 1.1–2.2)
ALP SERPL-CCNC: 98 U/L (ref 40–129)
ALT SERPL-CCNC: 16 U/L (ref 10–40)
ANION GAP SERPL CALCULATED.3IONS-SCNC: 12 MMOL/L (ref 3–16)
AST SERPL-CCNC: 22 U/L (ref 15–37)
BASOPHILS # BLD: 0.1 K/UL (ref 0–0.2)
BASOPHILS NFR BLD: 0.7 %
BILIRUB SERPL-MCNC: 1 MG/DL (ref 0–1)
BUN SERPL-MCNC: 19 MG/DL (ref 7–20)
CALCIUM SERPL-MCNC: 9.1 MG/DL (ref 8.3–10.6)
CHLORIDE SERPL-SCNC: 100 MMOL/L (ref 99–110)
CHOLEST SERPL-MCNC: 150 MG/DL (ref 0–199)
CO2 SERPL-SCNC: 25 MMOL/L (ref 21–32)
CREAT SERPL-MCNC: 1.1 MG/DL (ref 0.8–1.3)
DEPRECATED RDW RBC AUTO: 14 % (ref 12.4–15.4)
EOSINOPHIL # BLD: 0.2 K/UL (ref 0–0.6)
EOSINOPHIL NFR BLD: 2.9 %
GFR SERPLBLD CREATININE-BSD FMLA CKD-EPI: 64 ML/MIN/{1.73_M2}
GLUCOSE SERPL-MCNC: 97 MG/DL (ref 70–99)
HCT VFR BLD AUTO: 38.5 % (ref 40.5–52.5)
HDLC SERPL-MCNC: 33 MG/DL (ref 40–60)
HGB BLD-MCNC: 13.6 G/DL (ref 13.5–17.5)
LDLC SERPL CALC-MCNC: 58 MG/DL
LYMPHOCYTES # BLD: 1.9 K/UL (ref 1–5.1)
LYMPHOCYTES NFR BLD: 24.2 %
MCH RBC QN AUTO: 31.9 PG (ref 26–34)
MCHC RBC AUTO-ENTMCNC: 35.2 G/DL (ref 31–36)
MCV RBC AUTO: 90.5 FL (ref 80–100)
MONOCYTES # BLD: 0.6 K/UL (ref 0–1.3)
MONOCYTES NFR BLD: 7.4 %
NEUTROPHILS # BLD: 5.2 K/UL (ref 1.7–7.7)
NEUTROPHILS NFR BLD: 64.8 %
PLATELET # BLD AUTO: 169 K/UL (ref 135–450)
PMV BLD AUTO: 8.9 FL (ref 5–10.5)
POTASSIUM SERPL-SCNC: 3.6 MMOL/L (ref 3.5–5.1)
PROT SERPL-MCNC: 7.5 G/DL (ref 6.4–8.2)
RBC # BLD AUTO: 4.25 M/UL (ref 4.2–5.9)
SODIUM SERPL-SCNC: 137 MMOL/L (ref 136–145)
TRIGL SERPL-MCNC: 294 MG/DL (ref 0–150)
TSH SERPL DL<=0.005 MIU/L-ACNC: 3.37 UIU/ML (ref 0.27–4.2)
VLDLC SERPL CALC-MCNC: 59 MG/DL
WBC # BLD AUTO: 8.1 K/UL (ref 4–11)

## 2024-07-10 PROCEDURE — 1123F ACP DISCUSS/DSCN MKR DOCD: CPT | Performed by: INTERNAL MEDICINE

## 2024-07-10 PROCEDURE — 99213 OFFICE O/P EST LOW 20 MIN: CPT | Performed by: INTERNAL MEDICINE

## 2024-07-10 ASSESSMENT — ENCOUNTER SYMPTOMS
ABDOMINAL PAIN: 0
WHEEZING: 0
SHORTNESS OF BREATH: 0
NAUSEA: 0
VOMITING: 0
RHINORRHEA: 0
BACK PAIN: 0

## 2024-07-10 NOTE — PROGRESS NOTES
(RA pressure 15 mmHg).  The right atrium is severely dilated.  IVC size is dilated (>2.1 cm) and collapses < 50% with respiration  consistent with markedly elevated RA pressure (15 mmHg).  No obvious masses, thrombi, or vegetations are noted.    ECHO done 7/2015  Conclusions    Summary  This is a limited study for CHF, cardiomyopathy and mitral regurgitation.  Ejection fraction calculated by Monge''s method is 62 %.  LV systolic function is normal with ejection fraction estimated to be   60%.  No regional wall motion abnormalities are noted.  Mild mitral regurgitation is present.  There is mild concentric left ventricular hypertrophy.  There is reversal of E/A inflow velocities across the mitral valve  suggesting type I diastolic dysfunction.  Mild aortic regurgitation is present.    Assessment:       Diagnosis Orders   1. Primary hypertension        2. Chronic systolic congestive heart failure (HCC)        3. Dilated cardiomyopathy (HCC)        4. Hypertensive heart disease with chronic systolic congestive heart failure (HCC)        5. Nonrheumatic mitral valve regurgitation        6. Mixed hyperlipidemia                   Plan:   Assessment & Plan   1. Chronic systolic CHF.  He is off lasix and aldactone. He is on Coreg. Cannot take Lisinopril due to angioedema. Doing well. BP up some today.  2.  Hypertension: controlled. BP up some today. It was normal at cardiology yesterday.  3.  Hypertensive heart disease.  From uncontrolled HTN. Doing well.  4.  CMP from hypertension. Stable.  5.  MR and AI: stable.  6.  Angioedema from Lisinopril resolved.

## 2024-07-10 NOTE — TELEPHONE ENCOUNTER
----- Message from Milan Gallegos MD sent at 7/10/2024  3:03 PM EDT -----  Blood test looks good.  ----- Message -----  From: [facility] Incoming Lab Results From Soft (Epic Adt)  Sent: 7/10/2024   7:18 AM EDT  To: Milan Gallegos MD

## 2025-03-05 ENCOUNTER — OFFICE VISIT (OUTPATIENT)
Dept: INTERNAL MEDICINE CLINIC | Age: 89
End: 2025-03-05

## 2025-03-05 VITALS
RESPIRATION RATE: 12 BRPM | HEIGHT: 62 IN | WEIGHT: 154 LBS | BODY MASS INDEX: 28.34 KG/M2 | SYSTOLIC BLOOD PRESSURE: 148 MMHG | DIASTOLIC BLOOD PRESSURE: 100 MMHG | HEART RATE: 68 BPM

## 2025-03-05 DIAGNOSIS — E78.2 MIXED HYPERLIPIDEMIA: ICD-10-CM

## 2025-03-05 DIAGNOSIS — I10 PRIMARY HYPERTENSION: ICD-10-CM

## 2025-03-05 DIAGNOSIS — I42.0 DILATED CARDIOMYOPATHY (HCC): ICD-10-CM

## 2025-03-05 DIAGNOSIS — I11.0 HYPERTENSIVE HEART DISEASE WITH CHRONIC SYSTOLIC CONGESTIVE HEART FAILURE (HCC): ICD-10-CM

## 2025-03-05 DIAGNOSIS — I50.22 HYPERTENSIVE HEART DISEASE WITH CHRONIC SYSTOLIC CONGESTIVE HEART FAILURE (HCC): ICD-10-CM

## 2025-03-05 DIAGNOSIS — I50.22 CHRONIC SYSTOLIC CONGESTIVE HEART FAILURE (HCC): Primary | ICD-10-CM

## 2025-03-05 DIAGNOSIS — I34.0 NONRHEUMATIC MITRAL VALVE REGURGITATION: ICD-10-CM

## 2025-03-05 PROCEDURE — 1160F RVW MEDS BY RX/DR IN RCRD: CPT | Performed by: INTERNAL MEDICINE

## 2025-03-05 PROCEDURE — 1159F MED LIST DOCD IN RCRD: CPT | Performed by: INTERNAL MEDICINE

## 2025-03-05 PROCEDURE — 1123F ACP DISCUSS/DSCN MKR DOCD: CPT | Performed by: INTERNAL MEDICINE

## 2025-03-05 PROCEDURE — 99214 OFFICE O/P EST MOD 30 MIN: CPT | Performed by: INTERNAL MEDICINE

## 2025-03-05 ASSESSMENT — PATIENT HEALTH QUESTIONNAIRE - PHQ9
SUM OF ALL RESPONSES TO PHQ QUESTIONS 1-9: 0
1. LITTLE INTEREST OR PLEASURE IN DOING THINGS: NOT AT ALL
2. FEELING DOWN, DEPRESSED OR HOPELESS: NOT AT ALL
SUM OF ALL RESPONSES TO PHQ QUESTIONS 1-9: 0

## 2025-03-05 ASSESSMENT — ENCOUNTER SYMPTOMS
WHEEZING: 0
SHORTNESS OF BREATH: 0
RHINORRHEA: 0
BACK PAIN: 0
VOMITING: 0
ABDOMINAL PAIN: 0
NAUSEA: 0

## 2025-03-05 NOTE — PROGRESS NOTES
Subjective:      Patient ID: Jimmy Jose is a 89 y.o. male.    HPI  Patient is here for follow up.       He has hypertension. It is under good control at home. He has white coat HTN. He also refuses anymore medication.  He has chronic systolic CHF. No orthopnea or PND attacks. He has mild pedal edema. He is off Lasix and Aldactone. He has seen cardiology. No visits to the Er.   He denies any chest pain. No dyspnea. He has cardiomyopathy causing above problems. He sees a cardiologist. His EF has recovered and last ECHO showed normal EF.  He has mitral regurgitation/aortic regurgitation. It is stable.    He has history of angioedema to lisinopril.     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    Review of Systems   Constitutional:  Negative for activity change and appetite change.   HENT:  Negative for postnasal drip and rhinorrhea.    Respiratory:  Negative for shortness of breath and wheezing.    Cardiovascular:  Negative for chest pain, palpitations and leg swelling.   Gastrointestinal:  Negative for abdominal pain, nausea and vomiting.   Genitourinary:  Negative for difficulty urinating and frequency.   Musculoskeletal:  Negative for back pain and joint swelling.   Skin:  Negative for rash.   Neurological:  Negative for light-headedness.   Psychiatric/Behavioral:  Negative for sleep disturbance.          Past Medical History:   Diagnosis Date    Angio-edema 08/19/2021    Chronic systolic CHF (congestive heart failure) (HCC)     Heart disease     Hyperlipidemia     Hypertension      Past Surgical History:   Procedure Laterality Date    CATARACT REMOVAL WITH IMPLANT Left 3/16/16    EYE SURGERY Right 1/21/16    Cataract removal with implant Dr. Klein    FRACTURE SURGERY       Family History   Problem Relation Age of Onset    Heart Disease Mother     High Blood Pressure Mother     Heart Disease Father     High Blood Pressure Father     Diabetes Brother     Heart Disease Brother     Stroke

## 2025-06-17 DIAGNOSIS — I50.22 HYPERTENSIVE HEART DISEASE WITH CHRONIC SYSTOLIC CONGESTIVE HEART FAILURE (HCC): ICD-10-CM

## 2025-06-17 DIAGNOSIS — I11.0 HYPERTENSIVE HEART DISEASE WITH CHRONIC SYSTOLIC CONGESTIVE HEART FAILURE (HCC): ICD-10-CM

## 2025-06-17 DIAGNOSIS — E78.2 MIXED HYPERLIPIDEMIA: ICD-10-CM

## 2025-06-17 DIAGNOSIS — Z79.899 MEDICATION MANAGEMENT: ICD-10-CM

## 2025-06-17 NOTE — TELEPHONE ENCOUNTER
Last Office Visit: 7/9/2024 Provider: CHARLENE  **Is provider OOT? No    Next Office Visit:   **If no OV, when does pt need to be seen? in 6 month(s)  **Has patient already had 30 day supply? No    LAST LABS:   BMP:  Lab Results   Component Value Date/Time     03/05/2025 12:05 PM    K 3.9 03/05/2025 12:05 PM    K 4.1 08/20/2021 05:53 AM     03/05/2025 12:05 PM    CO2 25 03/05/2025 12:05 PM    BUN 23 03/05/2025 12:05 PM    CREATININE 1.1 03/05/2025 12:05 PM    GLUCOSE 92 03/05/2025 12:05 PM    CALCIUM 9.5 03/05/2025 12:05 PM    LABGLOM 64 03/05/2025 12:05 PM    LABGLOM >60 11/17/2023 11:45 AM      Liver:  Lab Results   Component Value Date    ALT 21 03/05/2025    AST 28 03/05/2025    ALKPHOS 99 03/05/2025    BILITOT 1.4 (H) 03/05/2025     Lipid:  Lab Results   Component Value Date    CHOL 179 03/05/2025    TRIG 185 (H) 03/05/2025    HDL 44 03/05/2025    LDL 98 03/05/2025    VLDL 37 03/05/2025     Lab orders needed? no

## 2025-06-18 RX ORDER — CARVEDILOL 12.5 MG/1
TABLET ORAL
Qty: 60 TABLET | Refills: 0 | Status: SHIPPED | OUTPATIENT
Start: 2025-06-18

## 2025-06-18 RX ORDER — ATORVASTATIN CALCIUM 10 MG/1
10 TABLET, FILM COATED ORAL DAILY
Qty: 30 TABLET | Refills: 0 | Status: SHIPPED | OUTPATIENT
Start: 2025-06-18

## 2025-06-18 RX ORDER — AMLODIPINE BESYLATE 5 MG/1
5 TABLET ORAL DAILY
Qty: 30 TABLET | Refills: 0 | Status: SHIPPED | OUTPATIENT
Start: 2025-06-18

## 2025-08-04 ENCOUNTER — OFFICE VISIT (OUTPATIENT)
Dept: INTERNAL MEDICINE CLINIC | Age: 89
End: 2025-08-04

## 2025-08-04 VITALS
HEIGHT: 62 IN | SYSTOLIC BLOOD PRESSURE: 138 MMHG | HEART RATE: 70 BPM | DIASTOLIC BLOOD PRESSURE: 80 MMHG | RESPIRATION RATE: 12 BRPM | WEIGHT: 152 LBS | BODY MASS INDEX: 27.97 KG/M2

## 2025-08-04 DIAGNOSIS — E78.2 MIXED HYPERLIPIDEMIA: ICD-10-CM

## 2025-08-04 DIAGNOSIS — I11.0 HYPERTENSIVE HEART DISEASE WITH CHRONIC SYSTOLIC CONGESTIVE HEART FAILURE (HCC): ICD-10-CM

## 2025-08-04 DIAGNOSIS — I50.22 HYPERTENSIVE HEART DISEASE WITH CHRONIC SYSTOLIC CONGESTIVE HEART FAILURE (HCC): ICD-10-CM

## 2025-08-04 DIAGNOSIS — I50.22 CHRONIC SYSTOLIC CONGESTIVE HEART FAILURE (HCC): ICD-10-CM

## 2025-08-04 DIAGNOSIS — I10 PRIMARY HYPERTENSION: Primary | ICD-10-CM

## 2025-08-04 DIAGNOSIS — Z79.899 MEDICATION MANAGEMENT: ICD-10-CM

## 2025-08-04 DIAGNOSIS — I34.0 NONRHEUMATIC MITRAL VALVE REGURGITATION: ICD-10-CM

## 2025-08-04 PROCEDURE — 99214 OFFICE O/P EST MOD 30 MIN: CPT | Performed by: INTERNAL MEDICINE

## 2025-08-04 PROCEDURE — 1160F RVW MEDS BY RX/DR IN RCRD: CPT | Performed by: INTERNAL MEDICINE

## 2025-08-04 PROCEDURE — 1159F MED LIST DOCD IN RCRD: CPT | Performed by: INTERNAL MEDICINE

## 2025-08-04 PROCEDURE — 1123F ACP DISCUSS/DSCN MKR DOCD: CPT | Performed by: INTERNAL MEDICINE

## 2025-08-04 RX ORDER — AMLODIPINE BESYLATE 5 MG/1
5 TABLET ORAL DAILY
Qty: 90 TABLET | Refills: 1 | Status: SHIPPED | OUTPATIENT
Start: 2025-08-04

## 2025-08-04 RX ORDER — ATORVASTATIN CALCIUM 10 MG/1
10 TABLET, FILM COATED ORAL DAILY
Qty: 90 TABLET | Refills: 1 | Status: SHIPPED | OUTPATIENT
Start: 2025-08-04

## 2025-08-04 RX ORDER — CARVEDILOL 12.5 MG/1
TABLET ORAL
Qty: 180 TABLET | Refills: 1 | Status: SHIPPED | OUTPATIENT
Start: 2025-08-04

## 2025-08-04 ASSESSMENT — ENCOUNTER SYMPTOMS
VOMITING: 0
ABDOMINAL PAIN: 0
NAUSEA: 0
BACK PAIN: 0
SHORTNESS OF BREATH: 0
RHINORRHEA: 0
WHEEZING: 0